# Patient Record
Sex: FEMALE | Race: WHITE | NOT HISPANIC OR LATINO | URBAN - METROPOLITAN AREA
[De-identification: names, ages, dates, MRNs, and addresses within clinical notes are randomized per-mention and may not be internally consistent; named-entity substitution may affect disease eponyms.]

---

## 2018-08-09 ENCOUNTER — EMERGENCY (EMERGENCY)
Facility: HOSPITAL | Age: 48
LOS: 0 days | Discharge: HOME | End: 2018-08-09
Attending: EMERGENCY MEDICINE | Admitting: EMERGENCY MEDICINE

## 2018-08-09 VITALS
OXYGEN SATURATION: 98 % | DIASTOLIC BLOOD PRESSURE: 71 MMHG | RESPIRATION RATE: 18 BRPM | HEART RATE: 86 BPM | TEMPERATURE: 97 F | SYSTOLIC BLOOD PRESSURE: 133 MMHG

## 2018-08-09 VITALS
HEIGHT: 69 IN | OXYGEN SATURATION: 99 % | TEMPERATURE: 98 F | WEIGHT: 250 LBS | DIASTOLIC BLOOD PRESSURE: 81 MMHG | HEART RATE: 69 BPM | SYSTOLIC BLOOD PRESSURE: 165 MMHG | RESPIRATION RATE: 20 BRPM

## 2018-08-09 DIAGNOSIS — Z90.49 ACQUIRED ABSENCE OF OTHER SPECIFIED PARTS OF DIGESTIVE TRACT: ICD-10-CM

## 2018-08-09 DIAGNOSIS — R06.4 HYPERVENTILATION: ICD-10-CM

## 2018-08-09 DIAGNOSIS — T39.1X5A ADVERSE EFFECT OF 4-AMINOPHENOL DERIVATIVES, INITIAL ENCOUNTER: ICD-10-CM

## 2018-08-09 DIAGNOSIS — R10.13 EPIGASTRIC PAIN: ICD-10-CM

## 2018-08-09 DIAGNOSIS — T39.315A ADVERSE EFFECT OF PROPIONIC ACID DERIVATIVES, INITIAL ENCOUNTER: ICD-10-CM

## 2018-08-09 DIAGNOSIS — F41.9 ANXIETY DISORDER, UNSPECIFIED: ICD-10-CM

## 2018-08-09 DIAGNOSIS — Z90.49 ACQUIRED ABSENCE OF OTHER SPECIFIED PARTS OF DIGESTIVE TRACT: Chronic | ICD-10-CM

## 2018-08-09 DIAGNOSIS — R43.9 UNSPECIFIED DISTURBANCES OF SMELL AND TASTE: ICD-10-CM

## 2018-08-09 LAB
ALBUMIN SERPL ELPH-MCNC: 4.1 G/DL — SIGNIFICANT CHANGE UP (ref 3.5–5.2)
ALP SERPL-CCNC: 139 U/L — HIGH (ref 30–115)
ALT FLD-CCNC: 29 U/L — SIGNIFICANT CHANGE UP (ref 0–41)
ANION GAP SERPL CALC-SCNC: 12 MMOL/L — SIGNIFICANT CHANGE UP (ref 7–14)
AST SERPL-CCNC: 52 U/L — HIGH (ref 0–41)
BASE EXCESS BLDV CALC-SCNC: 3.4 MMOL/L — HIGH (ref -2–2)
BASOPHILS # BLD AUTO: 0.14 K/UL — SIGNIFICANT CHANGE UP (ref 0–0.2)
BASOPHILS NFR BLD AUTO: 1.2 % — HIGH (ref 0–1)
BILIRUB DIRECT SERPL-MCNC: <0.2 MG/DL — SIGNIFICANT CHANGE UP (ref 0–0.2)
BILIRUB INDIRECT FLD-MCNC: >0.1 MG/DL — LOW (ref 0.2–1.2)
BILIRUB SERPL-MCNC: 0.3 MG/DL — SIGNIFICANT CHANGE UP (ref 0.2–1.2)
BUN SERPL-MCNC: 10 MG/DL — SIGNIFICANT CHANGE UP (ref 10–20)
CA-I SERPL-SCNC: 1.17 MMOL/L — SIGNIFICANT CHANGE UP (ref 1.12–1.3)
CALCIUM SERPL-MCNC: 9.1 MG/DL — SIGNIFICANT CHANGE UP (ref 8.5–10.1)
CHLORIDE SERPL-SCNC: 101 MMOL/L — SIGNIFICANT CHANGE UP (ref 98–110)
CO2 SERPL-SCNC: 25 MMOL/L — SIGNIFICANT CHANGE UP (ref 17–32)
CREAT SERPL-MCNC: 0.8 MG/DL — SIGNIFICANT CHANGE UP (ref 0.7–1.5)
EOSINOPHIL # BLD AUTO: 0.47 K/UL — SIGNIFICANT CHANGE UP (ref 0–0.7)
EOSINOPHIL NFR BLD AUTO: 4 % — SIGNIFICANT CHANGE UP (ref 0–8)
GAS PNL BLDV: 142 MMOL/L — SIGNIFICANT CHANGE UP (ref 136–145)
GAS PNL BLDV: SIGNIFICANT CHANGE UP
GLUCOSE SERPL-MCNC: 119 MG/DL — HIGH (ref 70–99)
HCO3 BLDV-SCNC: 30 MMOL/L — HIGH (ref 22–29)
HCT VFR BLD CALC: 45.5 % — SIGNIFICANT CHANGE UP (ref 37–47)
HCT VFR BLDA CALC: 43.7 % — SIGNIFICANT CHANGE UP (ref 34–44)
HGB BLD CALC-MCNC: 14.2 G/DL — SIGNIFICANT CHANGE UP (ref 14–18)
HGB BLD-MCNC: 15.5 G/DL — SIGNIFICANT CHANGE UP (ref 12–16)
HOROWITZ INDEX BLDV+IHG-RTO: 21 — SIGNIFICANT CHANGE UP
IMM GRANULOCYTES NFR BLD AUTO: 0.2 % — SIGNIFICANT CHANGE UP (ref 0.1–0.3)
LACTATE BLDV-MCNC: 0.7 MMOL/L — SIGNIFICANT CHANGE UP (ref 0.5–1.6)
LACTATE SERPL-SCNC: 1.3 MMOL/L — SIGNIFICANT CHANGE UP (ref 0.5–2.2)
LIDOCAIN IGE QN: 56 U/L — SIGNIFICANT CHANGE UP (ref 7–60)
LYMPHOCYTES # BLD AUTO: 3.72 K/UL — HIGH (ref 1.2–3.4)
LYMPHOCYTES # BLD AUTO: 31.7 % — SIGNIFICANT CHANGE UP (ref 20.5–51.1)
MAGNESIUM SERPL-MCNC: 2.1 MG/DL — SIGNIFICANT CHANGE UP (ref 1.8–2.4)
MCHC RBC-ENTMCNC: 32.2 PG — HIGH (ref 27–31)
MCHC RBC-ENTMCNC: 34.1 G/DL — SIGNIFICANT CHANGE UP (ref 32–37)
MCV RBC AUTO: 94.4 FL — SIGNIFICANT CHANGE UP (ref 81–99)
MONOCYTES # BLD AUTO: 1.1 K/UL — HIGH (ref 0.1–0.6)
MONOCYTES NFR BLD AUTO: 9.4 % — HIGH (ref 1.7–9.3)
NEUTROPHILS # BLD AUTO: 6.27 K/UL — SIGNIFICANT CHANGE UP (ref 1.4–6.5)
NEUTROPHILS NFR BLD AUTO: 53.5 % — SIGNIFICANT CHANGE UP (ref 42.2–75.2)
NRBC # BLD: 0 /100 WBCS — SIGNIFICANT CHANGE UP (ref 0–0)
PCO2 BLDV: 54 MMHG — HIGH (ref 41–51)
PH BLDV: 7.36 — SIGNIFICANT CHANGE UP (ref 7.26–7.43)
PLATELET # BLD AUTO: 302 K/UL — SIGNIFICANT CHANGE UP (ref 130–400)
PO2 BLDV: 41 MMHG — HIGH (ref 20–40)
POTASSIUM BLDV-SCNC: 3.8 MMOL/L — SIGNIFICANT CHANGE UP (ref 3.3–5.6)
POTASSIUM SERPL-MCNC: 6.7 MMOL/L — CRITICAL HIGH (ref 3.5–5)
POTASSIUM SERPL-SCNC: 6.7 MMOL/L — CRITICAL HIGH (ref 3.5–5)
PROT SERPL-MCNC: 7.4 G/DL — SIGNIFICANT CHANGE UP (ref 6–8)
RBC # BLD: 4.82 M/UL — SIGNIFICANT CHANGE UP (ref 4.2–5.4)
RBC # FLD: 12.1 % — SIGNIFICANT CHANGE UP (ref 11.5–14.5)
SAO2 % BLDV: 76 % — SIGNIFICANT CHANGE UP
SODIUM SERPL-SCNC: 138 MMOL/L — SIGNIFICANT CHANGE UP (ref 135–146)
TROPONIN T SERPL-MCNC: <0.01 NG/ML — SIGNIFICANT CHANGE UP
WBC # BLD: 11.72 K/UL — HIGH (ref 4.8–10.8)
WBC # FLD AUTO: 11.72 K/UL — HIGH (ref 4.8–10.8)

## 2018-08-09 RX ORDER — SODIUM CHLORIDE 9 MG/ML
1000 INJECTION INTRAMUSCULAR; INTRAVENOUS; SUBCUTANEOUS
Qty: 0 | Refills: 0 | Status: DISCONTINUED | OUTPATIENT
Start: 2018-08-09 | End: 2018-08-09

## 2018-08-09 RX ORDER — FAMOTIDINE 10 MG/ML
20 INJECTION INTRAVENOUS ONCE
Qty: 0 | Refills: 0 | Status: COMPLETED | OUTPATIENT
Start: 2018-08-09 | End: 2018-08-09

## 2018-08-09 RX ORDER — ONDANSETRON 8 MG/1
4 TABLET, FILM COATED ORAL ONCE
Qty: 0 | Refills: 0 | Status: COMPLETED | OUTPATIENT
Start: 2018-08-09 | End: 2018-08-09

## 2018-08-09 RX ADMIN — SODIUM CHLORIDE 250 MILLILITER(S): 9 INJECTION INTRAMUSCULAR; INTRAVENOUS; SUBCUTANEOUS at 21:38

## 2018-08-09 RX ADMIN — FAMOTIDINE 20 MILLIGRAM(S): 10 INJECTION INTRAVENOUS at 22:04

## 2018-08-09 RX ADMIN — ONDANSETRON 104 MILLIGRAM(S): 8 TABLET, FILM COATED ORAL at 21:35

## 2018-08-09 RX ADMIN — FAMOTIDINE 100 MILLIGRAM(S): 10 INJECTION INTRAVENOUS at 21:34

## 2018-08-09 NOTE — ED PROVIDER NOTE - PHYSICAL EXAMINATION
Gen: Alert, NAD, +hyperventilating/anxious in appearance  Head: NC, AT, PERRL, EOMI, +bilateral eye lid swelling no erythema   ENT: normal hearing, patent oropharynx mild erythema/no exudate, uvula midline  Neck: +supple, no tenderness  Pulm: Bilateral BS, normal resp effort, no wheeze/stridor/retractions  CV: RRR, no M/R/G, +dist pulses  Abd: soft, +epigastric tenderness  Neuro: AAOx3

## 2018-08-09 NOTE — ED PROVIDER NOTE - MEDICAL DECISION MAKING DETAILS
results reviewed and d/w patient and spouse.  Pt feeling better. wanst to go home.  Will d/c Vicodin.  She will follow up w ith her dentist as well

## 2018-08-09 NOTE — ED ADULT NURSE NOTE - NSIMPLEMENTINTERV_GEN_ALL_ED
Implemented All Universal Safety Interventions:  [place] to call system. Call bell, personal items and telephone within reach. Instruct patient to call for assistance. Room bathroom lighting operational. Non-slip footwear when patient is off stretcher. Physically safe environment: no spills, clutter or unnecessary equipment. Stretcher in lowest position, wheels locked, appropriate side rails in place.

## 2018-08-09 NOTE — ED PROVIDER NOTE - OBJECTIVE STATEMENT
Patient is a 48 years old F pmhx cholecystectomy presents to the ED for evaluation of epigastric abd pain that started today after taking a motrin and a vicodin for toothache. no fever no chills, no drooling, no rash,  sts patient is under great stress at work, no cp, +mild shortness of breath

## 2018-08-09 NOTE — ED PROVIDER NOTE - NS ED ROS FT
Review of Systems  Constitutional: (-) fever  Cardiovascular: (-) chest pain, (-) syncope  Respiratory: (-) cough, (-) shortness of breath  Gastrointestinal: (-) vomiting, (-) diarrhea  Integumentary: (-) rash, (-) edema  Neurological: (-) headache

## 2018-08-09 NOTE — ED PROVIDER NOTE - ATTENDING CONTRIBUTION TO CARE
47 yo F h/o cholecystectomy presents with c/o epigastric pain after taking Vicodin for the first time as well as Motrin for a tooth ache.  Felt SOB as well, since has past, no CP, no vomiting.  ON exam pt in NAD AAO x 3, Lungs CAT B/L no wrr, no rash, + tender epigastric area, ND, no edema,

## 2018-08-09 NOTE — ED ADULT TRIAGE NOTE - CHIEF COMPLAINT QUOTE
As per patient "I had a pain in my tooth and two hours ago I took four of motrin, then my friend gave me Vicodin about an hour and a half ago and it started hurting here (epigastric) and goes up to my chest, I started vomiting too".

## 2020-07-02 ENCOUNTER — INPATIENT (INPATIENT)
Facility: HOSPITAL | Age: 50
LOS: 2 days | Discharge: HOME | End: 2020-07-05
Attending: INTERNAL MEDICINE | Admitting: INTERNAL MEDICINE
Payer: COMMERCIAL

## 2020-07-02 VITALS
WEIGHT: 270.07 LBS | DIASTOLIC BLOOD PRESSURE: 87 MMHG | RESPIRATION RATE: 20 BRPM | HEART RATE: 108 BPM | HEIGHT: 69 IN | OXYGEN SATURATION: 99 % | TEMPERATURE: 98 F | SYSTOLIC BLOOD PRESSURE: 193 MMHG

## 2020-07-02 DIAGNOSIS — Z90.49 ACQUIRED ABSENCE OF OTHER SPECIFIED PARTS OF DIGESTIVE TRACT: Chronic | ICD-10-CM

## 2020-07-02 DIAGNOSIS — D73.5 INFARCTION OF SPLEEN: ICD-10-CM

## 2020-07-02 LAB
ALBUMIN SERPL ELPH-MCNC: 3.8 G/DL — SIGNIFICANT CHANGE UP (ref 3.5–5.2)
ALP SERPL-CCNC: 149 U/L — HIGH (ref 30–115)
ALT FLD-CCNC: 46 U/L — HIGH (ref 0–41)
ANION GAP SERPL CALC-SCNC: 11 MMOL/L — SIGNIFICANT CHANGE UP (ref 7–14)
APTT BLD: 32.8 SEC — SIGNIFICANT CHANGE UP (ref 27–39.2)
AST SERPL-CCNC: 43 U/L — HIGH (ref 0–41)
BASOPHILS # BLD AUTO: 0.1 K/UL — SIGNIFICANT CHANGE UP (ref 0–0.2)
BASOPHILS NFR BLD AUTO: 0.9 % — SIGNIFICANT CHANGE UP (ref 0–1)
BILIRUB SERPL-MCNC: 0.4 MG/DL — SIGNIFICANT CHANGE UP (ref 0.2–1.2)
BLD GP AB SCN SERPL QL: SIGNIFICANT CHANGE UP
BUN SERPL-MCNC: 6 MG/DL — LOW (ref 10–20)
CALCIUM SERPL-MCNC: 9.3 MG/DL — SIGNIFICANT CHANGE UP (ref 8.5–10.1)
CHLORIDE SERPL-SCNC: 104 MMOL/L — SIGNIFICANT CHANGE UP (ref 98–110)
CO2 SERPL-SCNC: 22 MMOL/L — SIGNIFICANT CHANGE UP (ref 17–32)
CREAT SERPL-MCNC: 0.8 MG/DL — SIGNIFICANT CHANGE UP (ref 0.7–1.5)
D DIMER BLD IA.RAPID-MCNC: 484 NG/ML DDU — HIGH (ref 0–230)
EOSINOPHIL # BLD AUTO: 0.27 K/UL — SIGNIFICANT CHANGE UP (ref 0–0.7)
EOSINOPHIL NFR BLD AUTO: 2.3 % — SIGNIFICANT CHANGE UP (ref 0–8)
GLUCOSE SERPL-MCNC: 117 MG/DL — HIGH (ref 70–99)
HCG SERPL QL: NEGATIVE — SIGNIFICANT CHANGE UP
HCT VFR BLD CALC: 41.9 % — SIGNIFICANT CHANGE UP (ref 37–47)
HGB BLD-MCNC: 14.5 G/DL — SIGNIFICANT CHANGE UP (ref 12–16)
IMM GRANULOCYTES NFR BLD AUTO: 0.3 % — SIGNIFICANT CHANGE UP (ref 0.1–0.3)
INR BLD: 1.16 RATIO — SIGNIFICANT CHANGE UP (ref 0.65–1.3)
LACTATE SERPL-SCNC: 1.2 MMOL/L — SIGNIFICANT CHANGE UP (ref 0.7–2)
LIDOCAIN IGE QN: 31 U/L — SIGNIFICANT CHANGE UP (ref 7–60)
LYMPHOCYTES # BLD AUTO: 1.94 K/UL — SIGNIFICANT CHANGE UP (ref 1.2–3.4)
LYMPHOCYTES # BLD AUTO: 16.8 % — LOW (ref 20.5–51.1)
MCHC RBC-ENTMCNC: 32.4 PG — HIGH (ref 27–31)
MCHC RBC-ENTMCNC: 34.6 G/DL — SIGNIFICANT CHANGE UP (ref 32–37)
MCV RBC AUTO: 93.5 FL — SIGNIFICANT CHANGE UP (ref 81–99)
MONOCYTES # BLD AUTO: 0.97 K/UL — HIGH (ref 0.1–0.6)
MONOCYTES NFR BLD AUTO: 8.4 % — SIGNIFICANT CHANGE UP (ref 1.7–9.3)
NEUTROPHILS # BLD AUTO: 8.21 K/UL — HIGH (ref 1.4–6.5)
NEUTROPHILS NFR BLD AUTO: 71.3 % — SIGNIFICANT CHANGE UP (ref 42.2–75.2)
NRBC # BLD: 0 /100 WBCS — SIGNIFICANT CHANGE UP (ref 0–0)
PLATELET # BLD AUTO: 247 K/UL — SIGNIFICANT CHANGE UP (ref 130–400)
POTASSIUM SERPL-MCNC: 5.2 MMOL/L — HIGH (ref 3.5–5)
POTASSIUM SERPL-SCNC: 5.2 MMOL/L — HIGH (ref 3.5–5)
PROT SERPL-MCNC: 6.8 G/DL — SIGNIFICANT CHANGE UP (ref 6–8)
PROTHROM AB SERPL-ACNC: 13.3 SEC — HIGH (ref 9.95–12.87)
RBC # BLD: 4.48 M/UL — SIGNIFICANT CHANGE UP (ref 4.2–5.4)
RBC # FLD: 12.3 % — SIGNIFICANT CHANGE UP (ref 11.5–14.5)
SARS-COV-2 RNA SPEC QL NAA+PROBE: SIGNIFICANT CHANGE UP
SODIUM SERPL-SCNC: 137 MMOL/L — SIGNIFICANT CHANGE UP (ref 135–146)
TROPONIN T SERPL-MCNC: <0.01 NG/ML — SIGNIFICANT CHANGE UP
WBC # BLD: 11.52 K/UL — HIGH (ref 4.8–10.8)
WBC # FLD AUTO: 11.52 K/UL — HIGH (ref 4.8–10.8)

## 2020-07-02 PROCEDURE — 99222 1ST HOSP IP/OBS MODERATE 55: CPT

## 2020-07-02 PROCEDURE — 71046 X-RAY EXAM CHEST 2 VIEWS: CPT | Mod: 26

## 2020-07-02 PROCEDURE — 99285 EMERGENCY DEPT VISIT HI MDM: CPT

## 2020-07-02 PROCEDURE — 71275 CT ANGIOGRAPHY CHEST: CPT | Mod: 26

## 2020-07-02 NOTE — H&P ADULT - NSHPLABSRESULTS_GEN_ALL_CORE
< from: CT Angio Chest w/ IV Cont (07.02.20 @ 16:43) >      EXAM:  CT ANGIO CHEST (W)AW IC            PROCEDURE DATE:  07/02/2020        < from: CT Angio Chest w/ IV Cont (07.02.20 @ 16:43) >      IMPRESSION:     No CT evidence of acute pulmonary embolus.    Findings suspicious for splenic infarct seen medially    Nonspecific bibasilar groundglass opacities.    Nonspecific prominent periportal lymph node measuring 1.6 x 3.3 cm. Outpatient abdominal pelvic CT is recommended for further evaluation    Right upper lobe 2 mm pulmonary nodule. Follow-up noncontrast chest CT is recommended in 12 months.    Spoke with MAURO MULLINS on 7/2/2020 5:50 PM with readback.      KEEGAN ANGULO M.D., RESIDENT RADIOLOGIST  This document has been electronically signed.  ARISTIDES KOHLER M.D., ATTENDING RADIOLOGIST  This document has been electronically signed. Jul 2 2020  5:57PM      < end of copied text >                          14.5   11.52 )-----------( 247      ( 02 Jul 2020 15:30 )             41.9     07-02    137  |  104  |  6<L>  ----------------------------<  117<H>  5.2<H>   |  22  |  0.8    Ca    9.3      02 Jul 2020 15:30    TPro  6.8  /  Alb  3.8  /  TBili  0.4  /  DBili  x   /  AST  43<H>  /  ALT  46<H>  /  AlkPhos  149<H>  07-02            PT/INR - ( 02 Jul 2020 18:07 )   PT: 13.30 sec;   INR: 1.16 ratio         PTT - ( 02 Jul 2020 18:07 )  PTT:32.8 sec  Lactate Trend  07-02 @ 15:30 Lactate:1.2     CARDIAC MARKERS ( 02 Jul 2020 15:30 )  x     / <0.01 ng/mL / x     / x     / x          CAPILLARY BLOOD GLUCOSE

## 2020-07-02 NOTE — ED PROVIDER NOTE - NS ED ROS FT
Review of Systems    Constitutional: (-) fever/ chills (-) weight loss  Eyes/ENT: (-) blurry vision, (-) epistaxis (-) sore throat (-) ear pain  Cardiovascular: (+)left chest pain, (-) syncope (-) palpitations  Respiratory: (-) cough, (-) shortness of breath  Gastrointestinal: (-) vomiting, (-) diarrhea (-) abdominal pain  Musculoskeletal: (-) neck pain, (-) back pain, (-) joint pain   Integumentary: (-) rash, (-) swelling  Neurological: (-) headache, (-) altered mental status  Psychiatric: (-) hallucinations or depression

## 2020-07-02 NOTE — H&P ADULT - HISTORY OF PRESENT ILLNESS
[age]yo female presents to the ER with 49yo female presents to the ER with left sided pleuritic chest pain since yesterday. Actually had abdominal pains over course of 2 days relieved with GAS-X. ER work-up revealed a splenic infarct

## 2020-07-02 NOTE — CONSULT NOTE ADULT - PROBLEM SELECTOR RECOMMENDATION 9
No acute surgical intervention  Presently no need for anticoagulation  D/w vascular fellow Dr Brown No acute surgical intervention  Presently no need for anticoagulation  Further workup by medical team  D/w vascular fellow Dr Brown whom will review ct scan  vascular team to follow up

## 2020-07-02 NOTE — ED PROVIDER NOTE - PROGRESS NOTE DETAILS
d/w surgery (covering vascular) PA who is at bedside vascular and gen surgery recommends no anticoagulation at this time. will admit to medical service dr. arana aware of admission E. Klerman: Pt signed out to me by Dr. Davey while awaiting covid result for admission.  Pt presented for flank pain, found to have splenic infarct.  Vasc and gen surg consulted, will adm for further care.

## 2020-07-02 NOTE — ED PROVIDER NOTE - MUSCULOSKELETAL, MLM
Spine appears normal, no CVA tenderness, range of motion is not limited, no muscle or joint tenderness

## 2020-07-02 NOTE — H&P ADULT - PROBLEM SELECTOR PLAN 2
Actually also showed lung nodule, ground glass opacities and enlarged lymph node  Will need follow-up

## 2020-07-02 NOTE — ED PROVIDER NOTE - OBJECTIVE STATEMENT
49 y/o female presents to the ED with pleuritic left sided chest pain since last night. patient denies any sob, palpitations, left arm pain, back pain, or syncope. patient states symptoms started two days ago with abdominal pain which was relieved with gas-x. patient states she drinks a lot of coffee and smokes cigarettes. patient denies any pain radiating to back . patient denies any family hx of clotting disorders , hx DVT or recent travel. patient c/o sharp pain.

## 2020-07-02 NOTE — ED PROVIDER NOTE - CLINICAL SUMMARY MEDICAL DECISION MAKING FREE TEXT BOX
50yF p/w L sided chest pain.  CT w/o PE but w/ splenic infarct.  No sig clinical suspicion for ACS.  Vasc and gen surg consulted, pain treated, will adm for further care.  Pt hemodynamically stable at this time.

## 2020-07-03 DIAGNOSIS — R93.89 ABNORMAL FINDINGS ON DIAGNOSTIC IMAGING OF OTHER SPECIFIED BODY STRUCTURES: ICD-10-CM

## 2020-07-03 LAB
ALBUMIN SERPL ELPH-MCNC: 4.1 G/DL — SIGNIFICANT CHANGE UP (ref 3.5–5.2)
ALP SERPL-CCNC: 149 U/L — HIGH (ref 30–115)
ALT FLD-CCNC: 44 U/L — HIGH (ref 0–41)
ANION GAP SERPL CALC-SCNC: 12 MMOL/L — SIGNIFICANT CHANGE UP (ref 7–14)
AST SERPL-CCNC: 28 U/L — SIGNIFICANT CHANGE UP (ref 0–41)
BILIRUB SERPL-MCNC: 0.3 MG/DL — SIGNIFICANT CHANGE UP (ref 0.2–1.2)
BUN SERPL-MCNC: 6 MG/DL — LOW (ref 10–20)
CALCIUM SERPL-MCNC: 9.3 MG/DL — SIGNIFICANT CHANGE UP (ref 8.5–10.1)
CHLORIDE SERPL-SCNC: 100 MMOL/L — SIGNIFICANT CHANGE UP (ref 98–110)
CO2 SERPL-SCNC: 23 MMOL/L — SIGNIFICANT CHANGE UP (ref 17–32)
CREAT SERPL-MCNC: 0.6 MG/DL — LOW (ref 0.7–1.5)
GLUCOSE SERPL-MCNC: 114 MG/DL — HIGH (ref 70–99)
HCT VFR BLD CALC: 41.4 % — SIGNIFICANT CHANGE UP (ref 37–47)
HGB BLD-MCNC: 14.1 G/DL — SIGNIFICANT CHANGE UP (ref 12–16)
MCHC RBC-ENTMCNC: 32.1 PG — HIGH (ref 27–31)
MCHC RBC-ENTMCNC: 34.1 G/DL — SIGNIFICANT CHANGE UP (ref 32–37)
MCV RBC AUTO: 94.3 FL — SIGNIFICANT CHANGE UP (ref 81–99)
NRBC # BLD: 0 /100 WBCS — SIGNIFICANT CHANGE UP (ref 0–0)
PLATELET # BLD AUTO: 254 K/UL — SIGNIFICANT CHANGE UP (ref 130–400)
POTASSIUM SERPL-MCNC: 4.2 MMOL/L — SIGNIFICANT CHANGE UP (ref 3.5–5)
POTASSIUM SERPL-SCNC: 4.2 MMOL/L — SIGNIFICANT CHANGE UP (ref 3.5–5)
PROT SERPL-MCNC: 6.5 G/DL — SIGNIFICANT CHANGE UP (ref 6–8)
RBC # BLD: 4.39 M/UL — SIGNIFICANT CHANGE UP (ref 4.2–5.4)
RBC # FLD: 12.3 % — SIGNIFICANT CHANGE UP (ref 11.5–14.5)
SARS-COV-2 IGG SERPL QL IA: NEGATIVE — SIGNIFICANT CHANGE UP
SARS-COV-2 IGM SERPL IA-ACNC: 0.07 INDEX — SIGNIFICANT CHANGE UP
SODIUM SERPL-SCNC: 135 MMOL/L — SIGNIFICANT CHANGE UP (ref 135–146)
WBC # BLD: 11.9 K/UL — HIGH (ref 4.8–10.8)
WBC # FLD AUTO: 11.9 K/UL — HIGH (ref 4.8–10.8)

## 2020-07-03 PROCEDURE — 99254 IP/OBS CNSLTJ NEW/EST MOD 60: CPT

## 2020-07-03 PROCEDURE — 74177 CT ABD & PELVIS W/CONTRAST: CPT | Mod: 26

## 2020-07-03 PROCEDURE — 99233 SBSQ HOSP IP/OBS HIGH 50: CPT

## 2020-07-03 RX ORDER — PANTOPRAZOLE SODIUM 20 MG/1
40 TABLET, DELAYED RELEASE ORAL
Refills: 0 | Status: DISCONTINUED | OUTPATIENT
Start: 2020-07-03 | End: 2020-07-05

## 2020-07-03 RX ORDER — ENOXAPARIN SODIUM 100 MG/ML
120 INJECTION SUBCUTANEOUS
Refills: 0 | Status: DISCONTINUED | OUTPATIENT
Start: 2020-07-03 | End: 2020-07-05

## 2020-07-03 RX ADMIN — Medication 30 MILLILITER(S): at 06:31

## 2020-07-03 RX ADMIN — PANTOPRAZOLE SODIUM 40 MILLIGRAM(S): 20 TABLET, DELAYED RELEASE ORAL at 06:30

## 2020-07-03 RX ADMIN — ENOXAPARIN SODIUM 120 MILLIGRAM(S): 100 INJECTION SUBCUTANEOUS at 21:37

## 2020-07-03 NOTE — CONSULT NOTE ADULT - SUBJECTIVE AND OBJECTIVE BOX
HPI:  Patient is an 49yo F smoker, obese, presenting to Ed with c/o acute onset of left side chest/abd pain for one day, described as intermittent, moderate to severe, aggravated by deep breathing and physical activity, and relieved with rest. Patient reporst nausea and vomiting 2 days ago. Pt denies denies associated fever, sob, cough, dysuria, hematochezia.       PAST MEDICAL & SURGICAL HISTORY:  No pertinent past medical history  History of cholecystectomy      Allergies    No Known Allergies      ROS:  General:	no fever, weight loss,  chills  Skin: no rash, ulcers  Ophthalmologic: no visual changes  ENMT:	no sore throat  Respiratory and Thorax: no cough, wheeze,  sob  Cardiovascular:	no chest pain, palpitations, dizziness  Gastrointestinal:	+ nausea, vomiting,  abd pain  Genitourinary:	no dysuria, hematuria  Musculoskeletal:	no joint pains  Neurological:	 no speech disturbance, focal weakness, numbness  Psychiatric:	no depression, anxiety, psychosis  Hematology/Lymphatics:	no anemia  Endocrine:	no polyuria, polydipsia        Vital Signs Last 24 Hrs  T(C): 36.9 (02 Jul 2020 14:59), Max: 36.9 (02 Jul 2020 14:59)  T(F): 98.4 (02 Jul 2020 14:59), Max: 98.4 (02 Jul 2020 14:59)  HR: 82 (02 Jul 2020 16:25) (82 - 108)  BP: 166/84 (02 Jul 2020 16:25) (123/59 - 193/87)  BP(mean): --  RR: 20 (02 Jul 2020 16:25) (20 - 20)  SpO2: 99% (02 Jul 2020 16:25) (96% - 99%)    PHYSICAL EXAM:      Constitutional: A&Ox4  Respiratory: cta b/l  Cardiovascular: s1 s2 rrr  Gastrointestinal: soft nt  nd + bs no rebound or guarding  Genitourinary: no cva tenderness  Extremities: normal rom, no edema, calf tenderness  Neurological:no focal deficits  Skin: no rash                            14.5   11.52 )-----------( 247      ( 02 Jul 2020 15:30 )             41.9       07-02    137  |  104  |  6<L>  ----------------------------<  117<H>  5.2<H>   |  22  |  0.8    Ca    9.3      02 Jul 2020 15:30    TPro  6.8  /  Alb  3.8  /  TBili  0.4  /  DBili  x   /  AST  43<H>  /  ALT  46<H>  /  AlkPhos  149<H>  07-02          < from: CT Angio Chest w/ IV Cont (07.02.20 @ 16:43) >  BONES/SOFT TISSUES: Multilevel degenerative changes of the spine..      IMPRESSION:     No CT evidence of acute pulmonary embolus.    Findings suspicious for splenic infarct seen medially    Nonspecific bibasilar groundglass opacities.    Nonspecific prominent periportal lymph node measuring 1.6 x 3.3 cm. Outpatient abdominal pelvic CT is recommended for further evaluation    Right upper lobe 2 mm pulmonary nodule. Follow-up noncontrast chest CT is recommended in 12 months.
Patient is a 50y old  Female who presents with a chief complaint of Splenic infarct (03 Jul 2020 09:58)      HPI:  51yo female presents to the ER with left sided pleuritic chest pain since yesterday. Actually had abdominal pains over course of 2 days relieved with GAS-X. ER work-up revealed a splenic infarct (02 Jul 2020 19:58).  No prior H/o dvt OR PE. No H/o miscarriages. No FH of DVT/PE,  No H/O Covid infection recenly       ROS:  Negative     PAST MEDICAL & SURGICAL HISTORY:  No pertinent past medical history  History of cholecystectomy      SOCIAL HISTORY: Smoker 1ppd    FAMILY HISTORY: No H/O DVT or cancer      MEDICATIONS  (STANDING):  enoxaparin Injectable 120 milliGRAM(s) SubCutaneous two times a day  pantoprazole    Tablet 40 milliGRAM(s) Oral before breakfast    MEDICATIONS  (PRN):  aluminum hydroxide/magnesium hydroxide/simethicone Suspension 30 milliLiter(s) Oral every 6 hours PRN Dyspepsia      Allergies    No Known Allergies    Intolerances        Vital Signs Last 24 Hrs  T(C): 36.4 (03 Jul 2020 13:18), Max: 37.1 (03 Jul 2020 06:22)  T(F): 97.6 (03 Jul 2020 13:18), Max: 98.7 (03 Jul 2020 06:22)  HR: 72 (03 Jul 2020 13:18) (72 - 108)  BP: 148/65 (03 Jul 2020 13:18) (123/59 - 193/87)  BP(mean): --  RR: 18 (03 Jul 2020 13:18) (18 - 20)  SpO2: 98% (02 Jul 2020 21:43) (96% - 99%)    PHYSICAL EXAM  General: adult in NAD  HEENT: clear oropharynx, anicteric sclera, pink conjunctiva  Neck: supple  CV: normal S1/S2 with no murmur rubs or gallops  Lungs: positive air movement b/l ant lungs,clear to auscultation, no wheezes, no rales  Abdomen: soft, mild LUQ tenderness,  non-distended, no hepatosplenomegaly  Ext: no clubbing cyanosis or edema  Skin: no rashes and no petechiae  Neuro: alert and oriented X 4, no focal deficits  Breasts No masses palpable    LABS:                          14.1   11.90 )-----------( 254      ( 03 Jul 2020 07:18 )             41.4         Mean Cell Volume : 94.3 fL  Mean Cell Hemoglobin : 32.1 pg  Mean Cell Hemoglobin Concentration : 34.1 g/dL  Auto Neutrophil # : x  Auto Lymphocyte # : x  Auto Monocyte # : x  Auto Eosinophil # : x  Auto Basophil # : x  Auto Neutrophil % : x  Auto Lymphocyte % : x  Auto Monocyte % : x  Auto Eosinophil % : x  Auto Basophil % : x      Serial CBC's  07-03 @ 07:18  Hct-41.4 / Hgb-14.1 / Plat-254 / RBC-4.39 / WBC-11.90  Serial CBC's  07-02 @ 15:30  Hct-41.9 / Hgb-14.5 / Plat-247 / RBC-4.48 / WBC-11.52      07-03    135  |  100  |  6<L>  ----------------------------<  114<H>  4.2   |  23  |  0.6<L>    Ca    9.3      03 Jul 2020 07:18    TPro  6.5  /  Alb  4.1  /  TBili  0.3  /  DBili  x   /  AST  28  /  ALT  44<H>  /  AlkPhos  149<H>  07-03      PT/INR - ( 02 Jul 2020 18:07 )   PT: 13.30 sec;   INR: 1.16 ratio         PTT - ( 02 Jul 2020 18:07 )  PTT:32.8 sec                BLOOD SMEAR INTERPRETATION:       RADIOLOGY & ADDITIONAL STUDIES:    < from: CT Abdomen and Pelvis w/ IV Cont (07.03.20 @ 13:03) >  CT ABDOMEN AND PELVIS IC            PROCEDURE DATE:  07/03/2020            INTERPRETATION:  CLINICAL STATEMENT: Splenic infarct      TECHNIQUE: Contiguous axial CT images were obtained from the lower chest to the pubic symphysis following administration of 100cc Optiray 320 intravenous contrast.  Oral contrast was not administered.  Reformatted images in the coronal and sagittal planes were acquired.    Correlation is made with CT angiography of the chest July 2, 2020      FINDINGS:    LOWER CHEST: Left basilar consolidation/atelectasis and trace effusion.    HEPATOBILIARY: Post cholecystectomy. No suspicious parenchymal lesion or biliary ductal dilatation.    SPLEEN: Wedge-shaped hypodensity posterior spleen consistent with infarction. Main splenic artery is patent.    PANCREAS: Unremarkable.    ADRENAL GLANDS: Unremarkable.    KIDNEYS: Symmetric renal enhancement without hydronephrosis.    ABDOMINOPELVIC NODES: Nonspecific periportal lymphadenopathy..    PELVIC ORGANS: Left adnexal 1.9 cm cyst (series 4 image 253).    PERITONEUM/MESENTERY/BOWEL: No bowel obstruction, pneumatosis, pneumoperitoneum or ascites. Normal caliber appendix. Scattered colonic diverticuli..    BONES/SOFT TISSUES: No acute osseous abnormality..    IMPRESSION:     Left basilar consolidation/atelectasis and trace effusion. Findings may reflect pneumonia superimposed on atelectasis.    Splenic infarction, as above. Main splenic artery is patent.    Nonspecific periportal lymphadenopathy.      < from: CT Angio Chest w/ IV Cont (07.02.20 @ 16:43) >   CT ANGIO CHEST (W)AW IC            PROCEDURE DATE:  07/02/2020            INTERPRETATION:  CLINICAL STATEMENT: Left-sided pleuritic chest pain.    TECHNIQUE: Multislice helical sections were obtained from the thoracic inlet to the lung basesduring rapid administration of 85 mL Optiray 320 intravenous contrast using a CTA protocol. Thin sections were reconstructed through the pulmonary vasculature. Coronal, sagittal and MIP reformatted images are also submitted.    COMPARISON CT: None.      FINDINGS:    PULMONARY EMBOLUS: No central or segmental pulmonary embolus..    LUNGS, PLEURA, AIRWAYS: Patent proximal tracheobronchial tree. No pleural effusion, pneumothorax. Bilateral subsegmental atelectasis. No honeycombing or bronchiectasis.There are bibasilar nonspecific groundglass opacities.    There is a 2 mm right upper lobe pulmonary nodule (3/150).    THORACIC NODES: Thyroid gland is present. No mediastinal, hilar or axillary lymphadenopathy. There are nonspecific subcentimeter mediastinal lymph nodes..    MEDIASTINUM/GREAT VESSELS: Normal heart size. No pericardial effusion. Main pulmonary artery and thoracic aorta are of normal caliber..    VISUALIZED UPPER ABDOMEN: Partially visualized upper abdomen demonstrates a medial splenic wedge shaped hypodensity suspicious for splenic infarct. Mildly prominent nonspecific periportal lymph node and outpatient abdominal pelvic CT is recommended. This measures 1.6 x 3.3 cm..    BONES/SOFT TISSUES: Multilevel degenerative changes of the spine..      IMPRESSION:     No CT evidence of acute pulmonary embolus.    Findings suspicious for splenic infarct seen medially    Nonspecific bibasilar groundglass opacities.    Nonspecific prominent periportal lymph node measuring 1.6 x 3.3 cm. Outpatient abdominal pelvic CT is recommended for further evaluation    Right upper lobe 2 mm pulmonary nodule. Follow-up noncontrast chest CT is recommended in 12 months.      < end of copied text >    < end of copied text >

## 2020-07-03 NOTE — CHART NOTE - NSCHARTNOTEFT_GEN_A_CORE
Patient seen and examined throughout the course of the day.    Results of Labs/Imaging discussed as well as patient's plan.        -pt with splenic infract , placed on full dose anticoagulation as per hematology   -molecular genetic prothrombin and factor V ordered, consent obtained from the patient explained in detail to the pt a positive result may not result in disease , might require further genetic consultation. A detailed description of the risks associated with lab tests given,  pt read and verbalized understood the risks. Pt signed the consent   All patient's/family questions answered.  Patient and family encouraged to contact PA with any further issues.

## 2020-07-03 NOTE — CONSULT NOTE ADULT - ASSESSMENT
Pt is a 51yo F with splenic infarct.
In summary this is a 51yo female with findings of splenic infarct ; etiology unclear.    RECS  R/O thrombophilia: Check Antiphospholipid Ab, Cardiolipin Ab, Protein C, S, AT III, Marquis -2 mutation, Factor V leiden, PT gene mutation.  ECHO to R/O cardiac source for embolus.    Pain control with analgesics.  May consider AC especially if a cause is confirmed.  Follow up in  hematology Clinic.    Plan d/w hospitalist

## 2020-07-03 NOTE — PROGRESS NOTE ADULT - ASSESSMENT
51yo female presents to the ER with left sided pleuritic chest pain since yesterday. Actually had abdominal pains over course of 2 days relieved with GAS-X. ER work-up revealed a splenic infarct     LUQ pain likely due splenic infarct  - discussed with hematology-will keep in hospital until prelim work up returns as pt may need to go home with anticoagulation  - hematology recommends;  protein c&s, antithrombin 3, prothrombin, factor v leiden, harjinder-2, antipholoiphid, anticardiolipin, lupus antibodies, COVID-19 antibodie testing, lipid profile  agree with starting full dose lovenox as pt still actively having pain  ct abdomen  2d echo r/o valve disease (has history of arrhytmias)  breast exam    Groundglass opacities and lung nodule  - pt is active 1.5 pack day smoker   - smoking cessation counseling done  - outpt follow up for nodules  - opacites could be from smoking    DVT px (on therapeutic dose)  Full Code  Independent, from home  Works at [employer], has daily temperature screening, denied having COVID 19 symptoms    #Progress Note Handoff:  Pending (specify):  Extensive lab testint  Family discussion: discussed pending heme work up  Disposition: Home_x__/SNF___/Other________/Unknown at this time________

## 2020-07-04 LAB
CHOLEST SERPL-MCNC: 172 MG/DL — SIGNIFICANT CHANGE UP (ref 100–200)
HDLC SERPL-MCNC: 47 MG/DL — LOW
LIPID PNL WITH DIRECT LDL SERPL: 114 MG/DL — SIGNIFICANT CHANGE UP (ref 4–129)
TOTAL CHOLESTEROL/HDL RATIO MEASUREMENT: 3.7 RATIO — LOW (ref 4–5.5)
TRIGL SERPL-MCNC: 85 MG/DL — SIGNIFICANT CHANGE UP (ref 10–149)

## 2020-07-04 PROCEDURE — 99232 SBSQ HOSP IP/OBS MODERATE 35: CPT

## 2020-07-04 RX ADMIN — PANTOPRAZOLE SODIUM 40 MILLIGRAM(S): 20 TABLET, DELAYED RELEASE ORAL at 05:42

## 2020-07-04 RX ADMIN — ENOXAPARIN SODIUM 120 MILLIGRAM(S): 100 INJECTION SUBCUTANEOUS at 05:42

## 2020-07-04 RX ADMIN — ENOXAPARIN SODIUM 120 MILLIGRAM(S): 100 INJECTION SUBCUTANEOUS at 18:02

## 2020-07-04 NOTE — PROGRESS NOTE ADULT - ASSESSMENT
49yo female presents to the ER with left sided pleuritic chest pain since yesterday. Actually had abdominal pains over course of 2 days relieved with GAS-X. ER work-up revealed a splenic infarct     LUQ pain likely due splenic infarct  - discussed with hematology-will keep in hospital until prelim work up returns as pt may need to go home with anticoagulation  - discussed with hematology yesterday  protein c&s, antithrombin 3, prothrombin, factor v leiden, harjinder-2, antipholoiphid, anticardiolipin, lupus antibodies, COVID-19 antibodie testing, lipid profile  agree with starting full dose lovenox as pt still actively having pain  COVID-19 AB negative  ct abdomen  confirms splenic infarct, underwise unremarkable  2d echo r/o valve disease (has history of arrhytmias)  unremarkable      Groundglass opacities and lung nodule  - ct abdomen showed more atelectasis than groundglass  - pt is active 1.5 pack day smoker   - smoking cessation counseling done  - outpt follow up for nodules  - opacites could be from smoking    DVT px (on therapeutic dose)  Full Code  Independent, from home  Works at [employer], has daily temperature screening, denied having COVID 19 symptoms    #Progress Note Handoff:  Pending (specify):  Extensive lab testing  Family discussion: discussed pending heme work up - pt to stay in house until some labs return so decision can be made about a/c  Disposition: Home_x__/SNF___/Other________/Unknown at this time________

## 2020-07-04 NOTE — PROGRESS NOTE ADULT - SUBJECTIVE AND OBJECTIVE BOX
CHIEF COMPLAINT:    Patient is a 50y old  Female who presents with a chief complaint of Splenic infarct    INTERVAL HPI/OVERNIGHT EVENTS:    Patient seen and examined at bedside. No acute overnight events occurred.    ROS: Reports LUQ. All other systems are negative.    Vital Signs:    T(F): 98.7 (07-03-20 @ 06:22), Max: 98.7 (07-03-20 @ 06:22)  HR: 87 (07-03-20 @ 06:22) (79 - 108)  BP: 135/62 (07-03-20 @ 06:22) (123/59 - 193/87)  RR: 18 (07-03-20 @ 06:22) (18 - 20)  SpO2: 98% (07-02-20 @ 21:43) (96% - 99%)  I&O's Summary    Daily Height in cm: 175.26 (02 Jul 2020 22:34)    Daily   CAPILLARY BLOOD GLUCOSE    PHYSICAL EXAM:  GENERAL:  NAD, obese  SKIN: No rashes or lesions  HEENT: Atraumatic. Normocephalic. Anicteric  NECK:  No JVD.   PULMONARY: Clear to ausculation bilaterally. No wheezing. No rales  CVS: Normal S1, S2. Regular rate and rhythm. No murmurs.  ABDOMEN/GI: Soft, tenderness to LUQ, no splenomegaly, Nondistended; Bowel sounds are present  EXTREMITIES:  No edema B/L LE.  NEUROLOGIC:  No motor deficit.  PSYCH: Alert & oriented x 3, normal affect    LABS:                        14.1   11.90 )-----------( 254      ( 03 Jul 2020 07:18 )             41.4     07-03    135  |  100  |  6<L>  ----------------------------<  114<H>  4.2   |  23  |  0.6<L>    Ca    9.3      03 Jul 2020 07:18    TPro  6.5  /  Alb  4.1  /  TBili  0.3  /  DBili  x   /  AST  28  /  ALT  44<H>  /  AlkPhos  149<H>  07-03    PT/INR - ( 02 Jul 2020 18:07 )   PT: 13.30 sec;   INR: 1.16 ratio         PTT - ( 02 Jul 2020 18:07 )  PTT:32.8 sec    Trop <0.01, CKMB --, CK --, 07-02-20 @ 15:30        RADIOLOGY & ADDITIONAL TESTS:  echo and CT abdomen pending    Medications:  Standing  enoxaparin Injectable 120 milliGRAM(s) SubCutaneous two times a day  pantoprazole    Tablet 40 milliGRAM(s) Oral before breakfast    PRN Meds  aluminum hydroxide/magnesium hydroxide/simethicone Suspension 30 milliLiter(s) Oral every 6 hours PRN
CHIEF COMPLAINT:    Patient is a 50y old  Female who presents with a chief complaint of Splenic infarct     INTERVAL HPI/OVERNIGHT EVENTS:    Patient seen and examined at bedside. No acute overnight events occurred.    ROS: Reports  LUQ pain with deep breaths. All other systems are negative.    Vital Signs:    T(F): 97.6 (07-04-20 @ 05:32), Max: 98.4 (07-03-20 @ 21:00)  HR: 78 (07-04-20 @ 05:32) (72 - 78)  BP: 120/58 (07-04-20 @ 05:32) (118/64 - 148/65)  RR: 16 (07-04-20 @ 05:32) (16 - 18)    PHYSICAL EXAM:  GENERAL:  NAD  SKIN: No rashes or lesions  HEENT: Atraumatic. Normocephalic. Anicteric  NECK:  No JVD.   PULMONARY: Clear to ausculation bilaterally. No wheezing. No rales  CVS: Normal S1, S2. Regular rate and rhythm. No murmurs.  ABDOMEN/GI: Soft, Nontender, Nondistended; Bowel sounds are present  EXTREMITIES:  No edema B/L LE.  NEUROLOGIC:  No motor deficit.  PSYCH: Alert & oriented x 3, normal affect    Consultant(s) Notes Reviewed:  [x ] YES  [ ] NO    LABS:                        14.1   11.90 )-----------( 254      ( 03 Jul 2020 07:18 )             41.4     07-03    135  |  100  |  6<L>  ----------------------------<  114<H>  4.2   |  23  |  0.6<L>    Ca    9.3      03 Jul 2020 07:18    TPro  6.5  /  Alb  4.1  /  TBili  0.3  /  DBili  x   /  AST  28  /  ALT  44<H>  /  AlkPhos  149<H>  07-03    PT/INR - ( 02 Jul 2020 18:07 )   PT: 13.30 sec;   INR: 1.16 ratio         PTT - ( 02 Jul 2020 18:07 )  PTT:32.8 sec    Trop <0.01, CKMB --, CK --, 07-02-20 @ 15:30        RADIOLOGY & ADDITIONAL TESTS:  2d echo unremarkable  < from: CT Abdomen and Pelvis w/ IV Cont (07.03.20 @ 13:03) >  Left basilar consolidation/atelectasis and trace effusion. Findings may reflect pneumonia superimposed on atelectasis.    Splenic infarction, as above. Main splenic artery is patent.    Nonspecific periportal lymphadenopathy.    < end of copied text >      Medications:  Standing  enoxaparin Injectable 120 milliGRAM(s) SubCutaneous two times a day  pantoprazole    Tablet 40 milliGRAM(s) Oral before breakfast    PRN Meds  aluminum hydroxide/magnesium hydroxide/simethicone Suspension 30 milliLiter(s) Oral every 6 hours PRN

## 2020-07-05 ENCOUNTER — TRANSCRIPTION ENCOUNTER (OUTPATIENT)
Age: 50
End: 2020-07-05

## 2020-07-05 VITALS
SYSTOLIC BLOOD PRESSURE: 97 MMHG | HEART RATE: 91 BPM | RESPIRATION RATE: 18 BRPM | DIASTOLIC BLOOD PRESSURE: 54 MMHG | TEMPERATURE: 98 F

## 2020-07-05 PROCEDURE — 99238 HOSP IP/OBS DSCHRG MGMT 30/<: CPT

## 2020-07-05 RX ORDER — APIXABAN 2.5 MG/1
1 TABLET, FILM COATED ORAL
Qty: 28 | Refills: 0
Start: 2020-07-05 | End: 2020-07-18

## 2020-07-05 RX ADMIN — PANTOPRAZOLE SODIUM 40 MILLIGRAM(S): 20 TABLET, DELAYED RELEASE ORAL at 06:11

## 2020-07-05 RX ADMIN — ENOXAPARIN SODIUM 120 MILLIGRAM(S): 100 INJECTION SUBCUTANEOUS at 06:11

## 2020-07-05 NOTE — DISCHARGE NOTE NURSING/CASE MANAGEMENT/SOCIAL WORK - PATIENT PORTAL LINK FT
You can access the FollowMyHealth Patient Portal offered by [facility] by registering at the following website: [email or web address]. By joining [facility]’s FollowMyHealth portal, you will also be able to view your health information using other applications (apps) compatible with our system.

## 2020-07-05 NOTE — DISCHARGE NOTE PROVIDER - CARE PROVIDER_API CALL
RAKESH HEIN  Hematology/Oncology  [address] [place] [address]  Phone: [phone]  Fax: [phone]  Follow Up Time:

## 2020-07-05 NOTE — DISCHARGE NOTE PROVIDER - NSDCCPCAREPLAN_GEN_ALL_CORE_FT
PRINCIPAL DISCHARGE DIAGNOSIS  Diagnosis: Splenic infarct  Assessment and Plan of Treatment: Please follow up with Dr. Saha. Her office should call you with follow up time. If you do not hear from them in 1-2 days, please call the office. Please take eliquis. Avoid taking NSAIDS (advil, motrin, naproxan) with Eliquis

## 2020-07-05 NOTE — DISCHARGE NOTE PROVIDER - HOSPITAL COURSE
49yo female presents to the ER with left sided pleuritic chest pain since yesterday. Actually had abdominal pains over course of 2 days relieved with GAS-X. ER work-up revealed a splenic infarct . Extensive work up sent for hypercoaguable cause of infarct. Spoke Hematology Dr. Saha who is agreeable to follow up results as outpt and agrees with Eliquis for now.         PHYSICAL EXAM:    GENERAL: NAD, speaks in full sentences, no signs of respiratory distress    HEAD:  Atraumatic, Normocephalic    EYES: EOMI, PERRLA, conjunctiva and sclera clear    NECK: Supple, No JVD    CHEST/LUNG: Clear to auscultation bilaterally; No wheeze; No crackles; No accessory muscles used    HEART: Regular rate and rhythm; No murmurs;     ABDOMEN: Soft, Nontender, Nondistended; Bowel sounds present; No guarding    EXTREMITIES:  2+ Peripheral Pulses, No cyanosis or edema    PSYCH: AAOx3    NEUROLOGY: non-focal    SKIN: No rashes or lesions

## 2020-07-06 LAB
AT III ACT/NOR PPP CHRO: 88 % — SIGNIFICANT CHANGE UP (ref 85–135)
CARDIOLIPIN AB SER-ACNC: POSITIVE

## 2020-07-07 DIAGNOSIS — D73.5 INFARCTION OF SPLEEN: ICD-10-CM

## 2020-07-07 DIAGNOSIS — R91.1 SOLITARY PULMONARY NODULE: ICD-10-CM

## 2020-07-07 DIAGNOSIS — F17.210 NICOTINE DEPENDENCE, CIGARETTES, UNCOMPLICATED: ICD-10-CM

## 2020-07-07 DIAGNOSIS — R59.9 ENLARGED LYMPH NODES, UNSPECIFIED: ICD-10-CM

## 2020-07-07 DIAGNOSIS — J98.11 ATELECTASIS: ICD-10-CM

## 2020-07-07 LAB
B2 GLYCOPROT1 AB SER QL: NEGATIVE — SIGNIFICANT CHANGE UP
JAK2 P.V617F BLD/T QL: SIGNIFICANT CHANGE UP

## 2020-07-08 LAB
CARDIOLIPIN IGM SER-MCNC: 22.1 MPL — HIGH (ref 0–12.5)
CARDIOLIPIN IGM SER-MCNC: <5 GPL — SIGNIFICANT CHANGE UP (ref 0–12.5)
DEPRECATED CARDIOLIPIN IGA SER: <5 APL — SIGNIFICANT CHANGE UP (ref 0–12.5)
PHOSPHOLIPID SERPL-MCNC: 201 MG/DL — SIGNIFICANT CHANGE UP (ref 151–264)
PTR INTERPRETATION: SIGNIFICANT CHANGE UP

## 2020-07-10 LAB
DRVVT SCREEN TO CONFIRM RATIO: SIGNIFICANT CHANGE UP
LA NT DPL PPP QL: SIGNIFICANT CHANGE UP
NORMALIZED SCT PPP-RTO: 0.84 RATIO — SIGNIFICANT CHANGE UP (ref 0–1.16)
NORMALIZED SCT PPP-RTO: SIGNIFICANT CHANGE UP
PROT C ACT/NOR PPP: 87 % — SIGNIFICANT CHANGE UP (ref 65–129)

## 2020-07-16 LAB — PROT S FREE PPP-ACNC: 92 % NORMAL — SIGNIFICANT CHANGE UP (ref 60–140)

## 2020-07-19 PROBLEM — Z00.00 ENCOUNTER FOR PREVENTIVE HEALTH EXAMINATION: Status: ACTIVE | Noted: 2020-07-19

## 2020-07-20 ENCOUNTER — APPOINTMENT (OUTPATIENT)
Dept: HEMATOLOGY ONCOLOGY | Facility: CLINIC | Age: 50
End: 2020-07-20
Payer: COMMERCIAL

## 2020-07-20 VITALS
RESPIRATION RATE: 16 BRPM | SYSTOLIC BLOOD PRESSURE: 153 MMHG | TEMPERATURE: 96.8 F | HEIGHT: 69 IN | WEIGHT: 270 LBS | HEART RATE: 91 BPM | DIASTOLIC BLOOD PRESSURE: 99 MMHG | BODY MASS INDEX: 39.99 KG/M2

## 2020-07-20 DIAGNOSIS — Z80.41 FAMILY HISTORY OF MALIGNANT NEOPLASM OF OVARY: ICD-10-CM

## 2020-07-20 DIAGNOSIS — Z80.0 FAMILY HISTORY OF MALIGNANT NEOPLASM OF DIGESTIVE ORGANS: ICD-10-CM

## 2020-07-20 DIAGNOSIS — F17.200 NICOTINE DEPENDENCE, UNSPECIFIED, UNCOMPLICATED: ICD-10-CM

## 2020-07-20 DIAGNOSIS — Z80.1 FAMILY HISTORY OF MALIGNANT NEOPLASM OF TRACHEA, BRONCHUS AND LUNG: ICD-10-CM

## 2020-07-20 PROCEDURE — 99215 OFFICE O/P EST HI 40 MIN: CPT

## 2020-07-20 NOTE — HISTORY OF PRESENT ILLNESS
[de-identified] : 49yo female with no significant PMH was seen in the hospital as a consult for splenic infarct. \par She was admitted on 7/2/20 for c/o abdominal pain and pleuritic chest pain and work up at that time revealed splenic infarct. She was on lovenox inpatient and was then discharged on eliquis which is compliant and tolerating with no complaints. Post hospital discharge she is doing well with no complaints and her abdominal pain resolved. No c/o poor appetite/weight loss. NO c/o bruising or bleeding. \par  She has no prior VTE events. No h/o known abnormal hear rhythms or AFib. She had prior Sx with CCY and C section and post op events were unremarkable with no clots. \par She has 2 children, no prior miscarriages. \par She is an active smoker- 1PPD> 15 years and has now cut down to 8 cigarettes a day. \par Up to date with colonoscopy and mammogram. Never had a colonoscopy \par \par Labs- WBC-11.9, Hb 14, Plt- 254, MCV-254. creatinine normal and LFT normal. D dimer- 474. COVID IgG negative. \par Hypercoag work up unremarkable except for elevated anti cardiolipin IgM-22. LA- negative, [id number] negative, PT gene mutation, CYNTHIA 2 negative, Anti thrombin III, Protein C assay normal. \par \par Radiology- 7/3/20 CT abdomen pelvis with contrast  - Left basilar consolidation/atelectasis and trace effusion. Findings may reflect pneumonia superimposed on atelectasis.\par Splenic infarction, as above. Main splenic artery is patent. Nonspecific periportal lymphadenopathy.\par CT chest- IMPRESSION:No CT evidence of acute pulmonary embolus. Findings suspicious for splenic infarct seen medially Nonspecific bibasilar ground glass opacities.\par Nonspecific prominent periportal lymph node measuring 1.6 x 3.3 cm. Outpatient abdominal pelvic CT is recommended for further evaluation. Right upper lobe 2 mm pulmonary nodule. Follow-up noncontrast chest CT is recommended in 12 months.\par

## 2020-07-20 NOTE — ASSESSMENT
[FreeTextEntry1] : 50 year old female with no comorbidities was found to have splenic infarct. Hypercoagulable work up negative except for mild elevation of anti cardiolipin IgM of 22. \par \par 1. Splenic infarct on AC with Eliquis \par Anticardiolipin IgM mildly elevated at 22( non specific) with normal LA and Anti Beta 2 GP. \par \par She needs cardiology evaluation- Will order Echo and she needs to see cardiology - Afib needs to ruled out. \par Will check Factor V Leiden\par Will repeat APS panel in 12 weeks (October 2020)\par She was advised to follow with GI for screening colonoscopy \par \par 2. Active smoker- Counselled on smoking cessation. \par 3. 2mm RT UL lung nodule- repeat CT chest in 1 year (July 2021)\par 4. Lt adnexal cyst noted on CT and non specific hilary portal LN enlargement- Check . GYB eval.\par \par RTC in 2 months \par \par Patient was seen and examined and plan discussed with Dr Saha \par \par

## 2020-07-21 LAB — CANCER AG125 SERPL-ACNC: 14 U/ML

## 2020-07-30 LAB — DNA PLOIDY SPEC FC-IMP: NORMAL

## 2020-08-06 ENCOUNTER — APPOINTMENT (OUTPATIENT)
Dept: GASTROENTEROLOGY | Facility: CLINIC | Age: 50
End: 2020-08-06
Payer: COMMERCIAL

## 2020-08-06 DIAGNOSIS — Z12.11 ENCOUNTER FOR SCREENING FOR MALIGNANT NEOPLASM OF COLON: ICD-10-CM

## 2020-08-06 DIAGNOSIS — R93.5 ABNORMAL FINDINGS ON DIAGNOSTIC IMAGING OF OTHER ABDOMINAL REGIONS, INCLUDING RETROPERITONEUM: ICD-10-CM

## 2020-08-06 PROCEDURE — 99204 OFFICE O/P NEW MOD 45 MIN: CPT | Mod: 95

## 2020-08-06 RX ORDER — APIXABAN 5 MG/1
5 TABLET, FILM COATED ORAL
Qty: 60 | Refills: 3 | Status: DISCONTINUED | COMMUNITY
Start: 2020-07-20 | End: 2020-08-06

## 2020-08-06 NOTE — HISTORY OF PRESENT ILLNESS
[Home] : at home, [unfilled] , at the time of the visit. [Verbal consent obtained from patient] : the patient, [unfilled] [Medical Office: ([facility])___] : at the medical office located in  [FreeTextEntry4] : Cee Cowan [de-identified] : 50 year old female pt increased risk for CRC, active smoker, who had recent abdominal pain evaluated at ED and was found to have splenic infarct, now on eliquis, being worked for hypercoagulable states by Dr Garvey. Pain resolved but pt is still complaining of post prandial bloating and abdominal distention, no pain or weight loss. \par Reports regular BMs, no blood in stools, or UGI complaints. \par As well on CT scan she was found to have a pulmonary nodule, and intra abdominal lymphadenopathy, being worked by Dr Garvey. \par Was supposed to start CRC screening at 42 years (strong fam hx of CRC).

## 2020-08-06 NOTE — PHYSICAL EXAM
[Hearing Threshold Finger Rub Not Cass] : hearing was normal [General Appearance - Alert] : alert [] : no respiratory distress [Oriented To Time, Place, And Person] : oriented to person, place, and time

## 2020-08-06 NOTE — ASSESSMENT
[FreeTextEntry1] : 50 year old female pt increased risk for CRC, active smoker, who had recent abdominal pain evaluated at ED and was found to have splenic infarct, now on eliquis, being worked for hypercoagulable states by Dr Garvey. Pain resolved but pt is still complaining of post prandial bloating and abdominal distention, no pain or weight loss. \par Reports regular BMs, no blood in stools, or UGI complaints. \par As well on CT scan she was found to have a pulmonary nodule, and intra abdominal lymphadenopathy, being worked by Dr Garvey. \par Was supposed to start CRC screening at 42 years (strong fam hx of CRC). \par \par Post prandial bloating/ increased risk for CRC\par needs EGD, screening colonoscopy\par Risks and benefits discussed with patient.\par Hold eliquis 2 days PTP\par Pulm eval for pulm nodule\par F/Up with Dr Garvey for intra abdom LN among others.

## 2020-08-17 ENCOUNTER — APPOINTMENT (OUTPATIENT)
Dept: HEMATOLOGY ONCOLOGY | Facility: CLINIC | Age: 50
End: 2020-08-17
Payer: COMMERCIAL

## 2020-08-17 VITALS
BODY MASS INDEX: 41.03 KG/M2 | TEMPERATURE: 98.4 F | SYSTOLIC BLOOD PRESSURE: 140 MMHG | DIASTOLIC BLOOD PRESSURE: 79 MMHG | WEIGHT: 277 LBS | HEIGHT: 69 IN | HEART RATE: 76 BPM

## 2020-08-17 PROCEDURE — 99214 OFFICE O/P EST MOD 30 MIN: CPT

## 2020-08-21 NOTE — HISTORY OF PRESENT ILLNESS
[de-identified] : 49yo female with no significant PMH was seen in the hospital as a consult for splenic infarct. \par She was admitted on 7/2/20 for c/o abdominal pain and pleuritic chest pain and work up at that time revealed splenic infarct. She was on lovenox inpatient and was then discharged on eliquis which is compliant and tolerating with no complaints. Post hospital discharge she is doing well with no complaints and her abdominal pain resolved. No c/o poor appetite/weight loss. NO c/o bruising or bleeding. \par  She has no prior VTE events. No h/o known abnormal hear rhythms or AFib. She had prior Sx with CCY and C section and post op events were unremarkable with no clots. \par She has 2 children, no prior miscarriages. \par She is an active smoker- 1PPD> 15 years and has now cut down to 8 cigarettes a day. \par Up to date with colonoscopy and mammogram. Never had a colonoscopy \par \par Labs- WBC-11.9, Hb 14, Plt- 254, MCV-254. creatinine normal and LFT normal. D dimer- 474. COVID IgG negative. \par Hypercoag work up unremarkable except for elevated anti cardiolipin IgM-22. LA- negative, Fldc8HE negative, PT gene mutation, CYNTHIA 2 negative, Anti thrombin III, Protein C assay normal. \par \par Radiology- 7/3/20 CT abdomen pelvis with contrast  - Left basilar consolidation/atelectasis and trace effusion. Findings may reflect pneumonia superimposed on atelectasis.\par Splenic infarction, as above. Main splenic artery is patent. Nonspecific periportal lymphadenopathy.\par CT chest- IMPRESSION:No CT evidence of acute pulmonary embolus. Findings suspicious for splenic infarct seen medially Nonspecific bibasilar ground glass opacities.\par Nonspecific prominent periportal lymph node measuring 1.6 x 3.3 cm. Outpatient abdominal pelvic CT is recommended for further evaluation. Right upper lobe 2 mm pulmonary nodule. Follow-up noncontrast chest CT is recommended in 12 months.\par  [de-identified] : 8/17/20- Valerie is here for follow up. She is compliant with her eliquis. No c/o easy bruising, bleeding. She is doing well with no new complaints. No c/o abdominal pain \par She is scheduled for colono/EGD on September 4th and is here to discuss when to start holding her eliquis. \par  She is also scheduled for stress test/ ?monitor placement by cardiology as well.

## 2020-08-21 NOTE — ASSESSMENT
[FreeTextEntry1] : 50 year old female with no comorbidities was found to have splenic infarct. Hypercoagulable work up negative except for mild elevation of anti cardiolipin IgM of 22. \par \par 1. Splenic infarct on AC with Eliquis \par Anticardiolipin IgM mildly elevated at 22( non specific) with normal LA and Anti Beta 2 GP. \par Factor V Leiden negative as well \par Echo from July 2020 shows normal EF\par \par She is scheduled for stress test and ?holter monitor placement by cardiology \par Will repeat APS panel in 12 weeks (October 2020)\par If cardio work up is negative for arrhythmia we might most likely stop AC in 3 months and keep her on ASA. \par \par \par Scheduled for EGD/colonoscopy with GI  on September 4th. \par She can hold eliquis 48hrs prior to procedure and can be started on the night of the procedure if no increased risk of bleeding. \par \par \par 2. Active smoker- Counselled on smoking cessation. \par 3. 2mm RT UL lung nodule- repeat CT chest in 1 year (July 2021)\par 4. Lt adnexal cyst noted on CT and non specific hilary portal LN enlargement-  normal. \par She is going to follow with Gynecology \par \par RTC in 1 month \par \par Patient was seen and examined and plan discussed with Dr Saha \par \par

## 2020-09-01 ENCOUNTER — LABORATORY RESULT (OUTPATIENT)
Age: 50
End: 2020-09-01

## 2020-09-01 ENCOUNTER — OUTPATIENT (OUTPATIENT)
Dept: OUTPATIENT SERVICES | Facility: HOSPITAL | Age: 50
LOS: 1 days | Discharge: HOME | End: 2020-09-01

## 2020-09-01 DIAGNOSIS — Z90.49 ACQUIRED ABSENCE OF OTHER SPECIFIED PARTS OF DIGESTIVE TRACT: Chronic | ICD-10-CM

## 2020-09-02 DIAGNOSIS — Z11.59 ENCOUNTER FOR SCREENING FOR OTHER VIRAL DISEASES: ICD-10-CM

## 2020-09-04 ENCOUNTER — RESULT REVIEW (OUTPATIENT)
Age: 50
End: 2020-09-04

## 2020-09-04 ENCOUNTER — TRANSCRIPTION ENCOUNTER (OUTPATIENT)
Age: 50
End: 2020-09-04

## 2020-09-04 ENCOUNTER — OUTPATIENT (OUTPATIENT)
Dept: OUTPATIENT SERVICES | Facility: HOSPITAL | Age: 50
LOS: 1 days | Discharge: HOME | End: 2020-09-04
Payer: COMMERCIAL

## 2020-09-04 VITALS — HEART RATE: 67 BPM | DIASTOLIC BLOOD PRESSURE: 84 MMHG | RESPIRATION RATE: 17 BRPM | SYSTOLIC BLOOD PRESSURE: 118 MMHG

## 2020-09-04 VITALS
HEART RATE: 87 BPM | WEIGHT: 272.05 LBS | HEIGHT: 69 IN | DIASTOLIC BLOOD PRESSURE: 85 MMHG | TEMPERATURE: 97 F | SYSTOLIC BLOOD PRESSURE: 131 MMHG | RESPIRATION RATE: 17 BRPM | OXYGEN SATURATION: 98 %

## 2020-09-04 DIAGNOSIS — Z90.49 ACQUIRED ABSENCE OF OTHER SPECIFIED PARTS OF DIGESTIVE TRACT: Chronic | ICD-10-CM

## 2020-09-04 DIAGNOSIS — Z98.890 OTHER SPECIFIED POSTPROCEDURAL STATES: Chronic | ICD-10-CM

## 2020-09-04 PROCEDURE — 45385 COLONOSCOPY W/LESION REMOVAL: CPT

## 2020-09-04 PROCEDURE — 43239 EGD BIOPSY SINGLE/MULTIPLE: CPT | Mod: XS

## 2020-09-04 PROCEDURE — 88305 TISSUE EXAM BY PATHOLOGIST: CPT | Mod: 26

## 2020-09-04 PROCEDURE — 88312 SPECIAL STAINS GROUP 1: CPT | Mod: 26

## 2020-09-04 PROCEDURE — 45380 COLONOSCOPY AND BIOPSY: CPT | Mod: XU

## 2020-09-04 NOTE — ASU DISCHARGE PLAN (ADULT/PEDIATRIC) - CALL YOUR DOCTOR IF YOU HAVE ANY OF THE FOLLOWING:
Fever greater than (need to indicate Fahrenheit or Celsius)/Pain not relieved by Medications/Nausea and vomiting that does not stop/Inability to tolerate liquids or foods/Bleeding that does not stop/Excessive diarrhea

## 2020-09-04 NOTE — CHART NOTE - NSCHARTNOTEFT_GEN_A_CORE
PACU ANESTHESIA ADMISSION NOTE      Procedure:   Post op diagnosis:      ____  Intubated  TV:______       Rate: ______      FiO2: ______    _x___  Patent Airway    __x__  Full return of protective reflexes    ___x_  Full recovery from anesthesia / back to baseline     Vitals:   T:           R:16                  BP: 150/94                 Sat:   96                P: 78      Mental Status:  _x___ Awake   _x____ Alert   _____ Drowsy   _____ Sedated    Nausea/Vomiting:  ____ NO  ______Yes,   See Post - Op Orders          Pain Scale (0-10):  _____    Treatment: ____ None    ____ See Post - Op/PCA Orders    Post - Operative Fluids:   ____ Oral   ____ See Post - Op Orders    Plan: Discharge:   x____Home       _____Floor     _____Critical Care    _____  Other:_________________    Comments:

## 2020-09-04 NOTE — H&P PST ADULT - HISTORY OF PRESENT ILLNESS
51 yo female increased risk CRC active smoker presents with post prandial bloating and abdominal distention no pain or weight loss. Given increased risk of CRC and post prandial bloating plan for EGD/Colonoscopy today

## 2020-09-08 LAB
SURGICAL PATHOLOGY STUDY: SIGNIFICANT CHANGE UP
SURGICAL PATHOLOGY STUDY: SIGNIFICANT CHANGE UP

## 2020-09-09 DIAGNOSIS — R10.13 EPIGASTRIC PAIN: ICD-10-CM

## 2020-09-09 DIAGNOSIS — Z12.11 ENCOUNTER FOR SCREENING FOR MALIGNANT NEOPLASM OF COLON: ICD-10-CM

## 2020-09-09 DIAGNOSIS — K64.4 RESIDUAL HEMORRHOIDAL SKIN TAGS: ICD-10-CM

## 2020-09-09 DIAGNOSIS — D12.0 BENIGN NEOPLASM OF CECUM: ICD-10-CM

## 2020-09-09 DIAGNOSIS — K62.1 RECTAL POLYP: ICD-10-CM

## 2020-09-09 DIAGNOSIS — K57.30 DIVERTICULOSIS OF LARGE INTESTINE WITHOUT PERFORATION OR ABSCESS WITHOUT BLEEDING: ICD-10-CM

## 2020-09-09 DIAGNOSIS — K29.50 UNSPECIFIED CHRONIC GASTRITIS WITHOUT BLEEDING: ICD-10-CM

## 2020-09-09 DIAGNOSIS — Z20.828 CONTACT WITH AND (SUSPECTED) EXPOSURE TO OTHER VIRAL COMMUNICABLE DISEASES: ICD-10-CM

## 2020-09-09 DIAGNOSIS — B96.81 HELICOBACTER PYLORI [H. PYLORI] AS THE CAUSE OF DISEASES CLASSIFIED ELSEWHERE: ICD-10-CM

## 2020-09-09 DIAGNOSIS — Z90.49 ACQUIRED ABSENCE OF OTHER SPECIFIED PARTS OF DIGESTIVE TRACT: ICD-10-CM

## 2020-09-09 DIAGNOSIS — K20.9 ESOPHAGITIS, UNSPECIFIED: ICD-10-CM

## 2020-09-09 DIAGNOSIS — Z79.01 LONG TERM (CURRENT) USE OF ANTICOAGULANTS: ICD-10-CM

## 2020-09-18 PROBLEM — S36.029A: Chronic | Status: ACTIVE | Noted: 2020-09-04

## 2020-09-21 ENCOUNTER — APPOINTMENT (OUTPATIENT)
Dept: HEMATOLOGY ONCOLOGY | Facility: CLINIC | Age: 50
End: 2020-09-21
Payer: COMMERCIAL

## 2020-09-21 VITALS
HEIGHT: 69 IN | TEMPERATURE: 97.3 F | SYSTOLIC BLOOD PRESSURE: 163 MMHG | BODY MASS INDEX: 40.43 KG/M2 | HEART RATE: 76 BPM | DIASTOLIC BLOOD PRESSURE: 85 MMHG | WEIGHT: 273 LBS

## 2020-09-21 PROCEDURE — 99214 OFFICE O/P EST MOD 30 MIN: CPT

## 2020-09-21 NOTE — ASSESSMENT
[FreeTextEntry1] : 50 year old female with no comorbidities was found to have splenic infarct. Hypercoagulable work up negative except for mild elevation of anti cardiolipin IgM of 22. \par \par 1. Splenic infarct on AC with Eliquis \par Anticardiolipin IgM mildly elevated at 22( non specific) with normal LA and Anti Beta 2 GP. \par Factor V Leiden negative and PT gene mutation negative as well \par Echo from July 2020 shows normal EF\par \par She had stress test- normal as per pt. She did not have monitor placed yet and has follow up with cardiology. \par Will repeat APS panel in 12 weeks (October 2020)\par If cardio work up is negative for arrhythmia we might most likely stop AC in 3 months and keep her on ASA 81 mg. \par \par s/p EGD and colonoscopy- hyperplastic polyps and Hpylori+- has appt to f/u with GI. \par \par \par \par 2. Active smoker- Counselled on smoking cessation. She continues to smoke. \par 3. 2mm RT UL lung nodule- repeat CT chest in 1 year (July 2021)\par 4. Lt adnexal cyst noted on CT and non specific hilary portal LN enlargement-  normal. \par Follows with Gynecology \par \par RTC in 1 month \par \par Patient was seen and examined and plan discussed with Dr Saha \par \par

## 2020-09-21 NOTE — HISTORY OF PRESENT ILLNESS
[de-identified] : 51yo female with no significant PMH was seen in the hospital as a consult for splenic infarct. \par She was admitted on 7/2/20 for c/o abdominal pain and pleuritic chest pain and work up at that time revealed splenic infarct. She was on lovenox inpatient and was then discharged on eliquis which is compliant and tolerating with no complaints. Post hospital discharge she is doing well with no complaints and her abdominal pain resolved. No c/o poor appetite/weight loss. NO c/o bruising or bleeding. \par  She has no prior VTE events. No h/o known abnormal hear rhythms or AFib. She had prior Sx with CCY and C section and post op events were unremarkable with no clots. \par She has 2 children, no prior miscarriages. \par She is an active smoker- 1PPD> 15 years and has now cut down to 8 cigarettes a day. \par Up to date with colonoscopy and mammogram. Never had a colonoscopy \par \par Labs- WBC-11.9, Hb 14, Plt- 254, MCV-254. creatinine normal and LFT normal. D dimer- 474. COVID IgG negative. \par Hypercoag work up unremarkable except for elevated anti cardiolipin IgM-22. LA- negative, [id number] negative, PT gene mutation, CYNTHIA 2 negative, Anti thrombin III, Protein C assay normal. \par \par Radiology- 7/3/20 CT abdomen pelvis with contrast  - Left basilar consolidation/atelectasis and trace effusion. Findings may reflect pneumonia superimposed on atelectasis.\par Splenic infarction, as above. Main splenic artery is patent. Nonspecific periportal lymphadenopathy.\par CT chest- IMPRESSION:No CT evidence of acute pulmonary embolus. Findings suspicious for splenic infarct seen medially Nonspecific bibasilar ground glass opacities.\par Nonspecific prominent periportal lymph node measuring 1.6 x 3.3 cm. Outpatient abdominal pelvic CT is recommended for further evaluation. Right upper lobe 2 mm pulmonary nodule. Follow-up noncontrast chest CT is recommended in 12 months.\par  [de-identified] : 8/17/20- Valerie is here for follow up. She is compliant with her eliquis. No c/o easy bruising, bleeding. She is doing well with no new complaints. No c/o abdominal pain \par She is scheduled for colono/EGD on September 4th and is here to discuss when to start holding her eliquis. \par  She is also scheduled for stress test/ ?monitor placement by cardiology as well.\par \par 9/21/20- Patient is here for follow up. She is doing well on eliquis with no complaints. No c/o bleeding or easy bruising. Her periods are not heavy. She had colono and EGD and was found to have hyperplastic polyps and gastritis and positive for Hpylori. She has an appt in 2 weeks to f/u GI and has not started treatment for Hpylori yet

## 2020-10-06 RX ORDER — POLYETHYLENE GLYCOL 3350 AND ELECTROLYTES WITH LEMON FLAVOR 236; 22.74; 6.74; 5.86; 2.97 G/4L; G/4L; G/4L; G/4L; G/4L
236 POWDER, FOR SOLUTION ORAL
Qty: 1 | Refills: 0 | Status: DISCONTINUED | COMMUNITY
Start: 2020-08-06 | End: 2020-10-06

## 2020-10-07 ENCOUNTER — APPOINTMENT (OUTPATIENT)
Dept: GASTROENTEROLOGY | Facility: CLINIC | Age: 50
End: 2020-10-07
Payer: COMMERCIAL

## 2020-10-07 DIAGNOSIS — R14.0 ABDOMINAL DISTENSION (GASEOUS): ICD-10-CM

## 2020-10-07 DIAGNOSIS — A04.8 OTHER SPECIFIED BACTERIAL INTESTINAL INFECTIONS: ICD-10-CM

## 2020-10-07 PROCEDURE — 99214 OFFICE O/P EST MOD 30 MIN: CPT | Mod: 95

## 2020-10-07 NOTE — HISTORY OF PRESENT ILLNESS
[Home] : at home, [unfilled] , at the time of the visit. [Medical Office: ([facility])___] : at the medical office located in  [Verbal consent obtained from patient] : the patient, [unfilled] [FreeTextEntry4] : Cee Cowan [de-identified] : 50 year old female pt increased risk for CRC, active smoker, who had recent abdominal pain evaluated at ED and was found to have splenic infarct, now on eliquis, being worked for hypercoagulable states by Dr Garvey. , no pain or weight loss. \par Reports regular BMs, no blood in stools, or UGI complaints. \par As well on CT scan she was found to have a pulmonary nodule, and intra abdominal lymphadenopathy, being worked by Dr Garvey. \par Was supposed to start CRC screening at 42 years (strong fam hx of CRC).\par Presents today for follow-up of EGD/Colonoscopy \par EGD biopsy positive for H-Pylori severe gastritis with IM of antrum\par Duodenal biopsy negative for CD\par Colonoscopy showed 7 polyps with TAs among others\par \par

## 2020-10-07 NOTE — ASSESSMENT
[FreeTextEntry1] : 50 year old female pt increased risk for CRC, active smoker, who had recent abdominal pain evaluated at ED and was found to have splenic infarct, now on eliquis, being worked for hypercoagulable states by Dr Garvey. Pain resolved but pt is still complaining of post prandial bloating and abdominal distention, no pain or weight loss. \par Reports regular BMs, no blood in stools, or UGI complaints. \par As well on CT scan she was found to have a pulmonary nodule, and intra abdominal lymphadenopathy, being worked by Dr Garvey. \par Was supposed to start CRC screening at 42 years (strong fam hx of CRC). \par \par Post prandial bloating/ increased risk for CRC\par s/p EGD H-pylori positive with IM of antrum\par -Triple abx therapy, followed by Fecal testing for H-pylori eradication confirmation 3 weeks later\par - needs EGD with antral mapping in 6 months (IM of cardia) \par call back for stool results\par \par s/p screening colonoscopy that showed 7 polyps: HP/TAs \par next colonoscopy advised in 2023\par \par F/Up with Dr Garvey for intra abdom LN among others. \par

## 2020-10-25 ENCOUNTER — LABORATORY RESULT (OUTPATIENT)
Age: 50
End: 2020-10-25

## 2020-10-26 ENCOUNTER — OUTPATIENT (OUTPATIENT)
Dept: OUTPATIENT SERVICES | Facility: HOSPITAL | Age: 50
LOS: 1 days | Discharge: HOME | End: 2020-10-26

## 2020-10-26 ENCOUNTER — APPOINTMENT (OUTPATIENT)
Dept: HEMATOLOGY ONCOLOGY | Facility: CLINIC | Age: 50
End: 2020-10-26
Payer: COMMERCIAL

## 2020-10-26 VITALS
HEART RATE: 80 BPM | TEMPERATURE: 98.6 F | BODY MASS INDEX: 41.03 KG/M2 | HEIGHT: 69 IN | DIASTOLIC BLOOD PRESSURE: 82 MMHG | SYSTOLIC BLOOD PRESSURE: 143 MMHG | WEIGHT: 277 LBS

## 2020-10-26 DIAGNOSIS — R76.0 RAISED ANTIBODY TITER: ICD-10-CM

## 2020-10-26 DIAGNOSIS — D73.5 INFARCTION OF SPLEEN: ICD-10-CM

## 2020-10-26 DIAGNOSIS — Z90.49 ACQUIRED ABSENCE OF OTHER SPECIFIED PARTS OF DIGESTIVE TRACT: Chronic | ICD-10-CM

## 2020-10-26 DIAGNOSIS — Z98.890 OTHER SPECIFIED POSTPROCEDURAL STATES: Chronic | ICD-10-CM

## 2020-10-26 PROCEDURE — 99214 OFFICE O/P EST MOD 30 MIN: CPT

## 2020-10-26 NOTE — ASSESSMENT
[FreeTextEntry1] : 50 year old female with no comorbidities was found to have splenic infarct. Hypercoagulable work up negative except for mild elevation of anti cardiolipin IgM of 22. \par \par 1. Splenic infarct on AC with Eliquis \par Anticardiolipin IgM mildly elevated at 22( non specific) with normal LA and Anti Beta 2 GP. \par Factor V Leiden negative and PT gene mutation negative as well \par Echo from July 2020 shows normal EF\par \par She had stress test- normal as per pt. She did not have monitor placed yet and has follow up with cardiology in 2 months \par Will repeat APS panel again today \par If work up negative or non specific plan is to DC eliquis and give ASA 81 mg \par \par On triple therapy for H pylori \par \par \par \par 2. Active smoker- Counselled on smoking cessation. She continues to smoke. \par 3. 2mm RT UL lung nodule- repeat CT chest in 1 year (July 2021)\par 4. Lt adnexal cyst noted on CT and non specific hilary portal LN enlargement-  normal. \par Follows with Gynecology \par \par RTC in 6 weeks \par \par Patient was seen and examined and plan discussed with Dr Saha \par \par

## 2020-10-26 NOTE — HISTORY OF PRESENT ILLNESS
[de-identified] : 51yo female with no significant PMH was seen in the hospital as a consult for splenic infarct. \par She was admitted on 7/2/20 for c/o abdominal pain and pleuritic chest pain and work up at that time revealed splenic infarct. She was on lovenox inpatient and was then discharged on eliquis which is compliant and tolerating with no complaints. Post hospital discharge she is doing well with no complaints and her abdominal pain resolved. No c/o poor appetite/weight loss. NO c/o bruising or bleeding. \par  She has no prior VTE events. No h/o known abnormal hear rhythms or AFib. She had prior Sx with CCY and C section and post op events were unremarkable with no clots. \par She has 2 children, no prior miscarriages. \par She is an active smoker- 1PPD> 15 years and has now cut down to 8 cigarettes a day. \par Up to date with colonoscopy and mammogram. Never had a colonoscopy \par \par Labs- WBC-11.9, Hb 14, Plt- 254, MCV-254. creatinine normal and LFT normal. D dimer- 474. COVID IgG negative. \par Hypercoag work up unremarkable except for elevated anti cardiolipin IgM-22. LA- negative, Rwmd4NU negative, PT gene mutation, CYNTHIA 2 negative, Anti thrombin III, Protein C assay normal. \par \par Radiology- 7/3/20 CT abdomen pelvis with contrast  - Left basilar consolidation/atelectasis and trace effusion. Findings may reflect pneumonia superimposed on atelectasis.\par Splenic infarction, as above. Main splenic artery is patent. Nonspecific periportal lymphadenopathy.\par CT chest- IMPRESSION:No CT evidence of acute pulmonary embolus. Findings suspicious for splenic infarct seen medially Nonspecific bibasilar ground glass opacities.\par Nonspecific prominent periportal lymph node measuring 1.6 x 3.3 cm. Outpatient abdominal pelvic CT is recommended for further evaluation. Right upper lobe 2 mm pulmonary nodule. Follow-up noncontrast chest CT is recommended in 12 months.\par  [de-identified] : 8/17/20- Valerie is here for follow up. She is compliant with her eliquis. No c/o easy bruising, bleeding. She is doing well with no new complaints. No c/o abdominal pain \par She is scheduled for colono/EGD on September 4th and is here to discuss when to start holding her eliquis. \par  She is also scheduled for stress test/ ?monitor placement by cardiology as well.\par \par 9/21/20- Patient is here for follow up. She is doing well on eliquis with no complaints. No c/o bleeding or easy bruising. Her periods are not heavy. She had colono and EGD and was found to have hyperplastic polyps, tubular adenoma and gastritis and positive for Hpylori. She has an appt in 2 weeks to f/u GI and has not started treatment for Hpylori yet\par \par 10/26/20- Patient is here fir follow up with no new complaints. She started triple therapy for H pylori and is going to get repeat EGD in April 2021. No c/o abdominal pain and she is complaint with eliquis

## 2020-10-27 LAB
CONFIRM: 31 SEC
DRVVT IMM 1:2 NP PPP: NORMAL
DRVVT SCREEN TO CONFIRM RATIO: 0.84 RATIO
SCREEN DRVVT: 28.9 SEC
SILICA CLOTTING TIME INTERPRETATION: NORMAL
SILICA CLOTTING TIME S/C: 0.86 RATIO

## 2020-10-28 LAB
B2 GLYCOPROT1 IGA SERPL IA-ACNC: 9.7 SAU
B2 GLYCOPROT1 IGG SER-ACNC: 8.8 SGU
B2 GLYCOPROT1 IGM SER-ACNC: 9.6 SMU

## 2020-10-29 LAB — CARDIOLIPIN IGM SER-MCNC: 19 MPL

## 2020-10-30 LAB — CARDIOLIPIN IGM SER-MCNC: <5 GPL

## 2020-12-10 ENCOUNTER — APPOINTMENT (OUTPATIENT)
Dept: HEMATOLOGY ONCOLOGY | Facility: CLINIC | Age: 50
End: 2020-12-10
Payer: COMMERCIAL

## 2020-12-10 ENCOUNTER — OUTPATIENT (OUTPATIENT)
Dept: OUTPATIENT SERVICES | Facility: HOSPITAL | Age: 50
LOS: 1 days | Discharge: HOME | End: 2020-12-10

## 2020-12-10 VITALS
TEMPERATURE: 97.9 F | HEIGHT: 69 IN | WEIGHT: 275 LBS | RESPIRATION RATE: 16 BRPM | BODY MASS INDEX: 40.73 KG/M2 | SYSTOLIC BLOOD PRESSURE: 153 MMHG | DIASTOLIC BLOOD PRESSURE: 79 MMHG | HEART RATE: 71 BPM

## 2020-12-10 DIAGNOSIS — Z98.890 OTHER SPECIFIED POSTPROCEDURAL STATES: Chronic | ICD-10-CM

## 2020-12-10 DIAGNOSIS — D73.5 INFARCTION OF SPLEEN: ICD-10-CM

## 2020-12-10 DIAGNOSIS — Z90.49 ACQUIRED ABSENCE OF OTHER SPECIFIED PARTS OF DIGESTIVE TRACT: Chronic | ICD-10-CM

## 2020-12-10 DIAGNOSIS — R76.0 RAISED ANTIBODY TITER: ICD-10-CM

## 2020-12-10 PROCEDURE — 99214 OFFICE O/P EST MOD 30 MIN: CPT

## 2020-12-10 NOTE — HISTORY OF PRESENT ILLNESS
[de-identified] : 51yo female with no significant PMH was seen in the hospital as a consult for splenic infarct. \par She was admitted on 7/2/20 for c/o abdominal pain and pleuritic chest pain and work up at that time revealed splenic infarct. She was on lovenox inpatient and was then discharged on eliquis which is compliant and tolerating with no complaints. Post hospital discharge she is doing well with no complaints and her abdominal pain resolved. No c/o poor appetite/weight loss. NO c/o bruising or bleeding. \par  She has no prior VTE events. No h/o known abnormal hear rhythms or AFib. She had prior Sx with CCY and C section and post op events were unremarkable with no clots. \par She has 2 children, no prior miscarriages. \par She is an active smoker- 1PPD> 15 years and has now cut down to 8 cigarettes a day. \par Up to date with colonoscopy and mammogram. Never had a colonoscopy \par \par Labs- WBC-11.9, Hb 14, Plt- 254, MCV-254. creatinine normal and LFT normal. D dimer- 474. COVID IgG negative. \par Hypercoag work up unremarkable except for elevated anti cardiolipin IgM-22. LA- negative, Xgtv7PE negative, PT gene mutation, CYNTHIA 2 negative, Anti thrombin III, Protein C assay normal. \par \par Radiology- 7/3/20 CT abdomen pelvis with contrast  - Left basilar consolidation/atelectasis and trace effusion. Findings may reflect pneumonia superimposed on atelectasis.\par Splenic infarction, as above. Main splenic artery is patent. Nonspecific periportal lymphadenopathy.\par CT chest- IMPRESSION:No CT evidence of acute pulmonary embolus. Findings suspicious for splenic infarct seen medially Nonspecific bibasilar ground glass opacities.\par Nonspecific prominent periportal lymph node measuring 1.6 x 3.3 cm. Outpatient abdominal pelvic CT is recommended for further evaluation. Right upper lobe 2 mm pulmonary nodule. Follow-up noncontrast chest CT is recommended in 12 months.\par  [de-identified] : 8/17/20- Valerie is here for follow up. She is compliant with her eliquis. No c/o easy bruising, bleeding. She is doing well with no new complaints. No c/o abdominal pain \par She is scheduled for colono/EGD on September 4th and is here to discuss when to start holding her eliquis. \par  She is also scheduled for stress test/ ?monitor placement by cardiology as well.\par \par 9/21/20- Patient is here for follow up. She is doing well on eliquis with no complaints. No c/o bleeding or easy bruising. Her periods are not heavy. She had colono and EGD and was found to have hyperplastic polyps, tubular adenoma and gastritis and positive for Hpylori. She has an appt in 2 weeks to f/u GI and has not started treatment for Hpylori yet\par \par 10/26/20- Patient is here fir follow up with no new complaints. She started triple therapy for H pylori and is going to get repeat EGD in April 2021. No c/o abdominal pain and she is complaint with eliquis \par \par 12/10/2020\par VALERIE MARR a 50 year F is here today for follow up. She is doing well on eliquis with no complaints. She continues triple therapy for H Pylori. She follow ups with cardiologist this month as well as pulmonary.

## 2020-12-10 NOTE — ASSESSMENT
[FreeTextEntry1] : 50 year old female with no comorbidities was found to have splenic infarct. Hypercoagulable work up negative except for mild elevation of anti cardiolipin IgM of 22. \par \par 1. Splenic infarct on AC with Eliquis. Work up is non specific therefore we will DC eliquis and start ASA 81 mg \par Anticardiolipin IgM mildly elevated at 19( non specific) with normal LA and Anti Beta 2 GP. \par Factor V Leiden negative and PT gene mutation negative as well \par Echo from July 2020 shows normal EF\par \par She had stress test- normal as per pt. She did not have monitor placed yet and has follow up with cardiology this month.\par \par On triple therapy for H pylori \par \par 2. Active smoker- Counselled on smoking cessation. She continues to smoke. \par 3. 2mm RT UL lung nodule- repeat CT chest in 1 year (July 2021)\par 4. Lt adnexal cyst noted on CT and non specific hilary portal LN enlargement-  normal. \par Follows with Gynecology \par \par RTC in 4 months\par \par Patient was seen and examined and plan discussed with Dr Saha \par \par

## 2020-12-29 DIAGNOSIS — D73.5 INFARCTION OF SPLEEN: ICD-10-CM

## 2020-12-29 DIAGNOSIS — R76.0 RAISED ANTIBODY TITER: ICD-10-CM

## 2021-01-11 NOTE — ASU DISCHARGE PLAN (ADULT/PEDIATRIC) - CARE PROVIDER_API CALL
Detail Level: Detailed Niurka Gage (MD)  Gastroenterology  [address] [place] [address]  Phone: [phone]  Fax: [phone]  Follow Up Time:

## 2021-04-15 ENCOUNTER — APPOINTMENT (OUTPATIENT)
Dept: HEMATOLOGY ONCOLOGY | Facility: CLINIC | Age: 51
End: 2021-04-15

## 2021-12-11 ENCOUNTER — EMERGENCY (EMERGENCY)
Facility: HOSPITAL | Age: 51
LOS: 0 days | Discharge: HOME | End: 2021-12-11
Attending: EMERGENCY MEDICINE | Admitting: EMERGENCY MEDICINE
Payer: COMMERCIAL

## 2021-12-11 VITALS
OXYGEN SATURATION: 98 % | DIASTOLIC BLOOD PRESSURE: 77 MMHG | HEART RATE: 94 BPM | HEIGHT: 69 IN | RESPIRATION RATE: 18 BRPM | TEMPERATURE: 97 F | WEIGHT: 240.08 LBS | SYSTOLIC BLOOD PRESSURE: 141 MMHG

## 2021-12-11 DIAGNOSIS — R39.15 URGENCY OF URINATION: ICD-10-CM

## 2021-12-11 DIAGNOSIS — R35.0 FREQUENCY OF MICTURITION: ICD-10-CM

## 2021-12-11 DIAGNOSIS — R10.30 LOWER ABDOMINAL PAIN, UNSPECIFIED: ICD-10-CM

## 2021-12-11 DIAGNOSIS — Z90.49 ACQUIRED ABSENCE OF OTHER SPECIFIED PARTS OF DIGESTIVE TRACT: ICD-10-CM

## 2021-12-11 DIAGNOSIS — Z90.49 ACQUIRED ABSENCE OF OTHER SPECIFIED PARTS OF DIGESTIVE TRACT: Chronic | ICD-10-CM

## 2021-12-11 DIAGNOSIS — K57.92 DIVERTICULITIS OF INTESTINE, PART UNSPECIFIED, WITHOUT PERFORATION OR ABSCESS WITHOUT BLEEDING: ICD-10-CM

## 2021-12-11 DIAGNOSIS — Z98.890 OTHER SPECIFIED POSTPROCEDURAL STATES: Chronic | ICD-10-CM

## 2021-12-11 DIAGNOSIS — F17.200 NICOTINE DEPENDENCE, UNSPECIFIED, UNCOMPLICATED: ICD-10-CM

## 2021-12-11 LAB
ALBUMIN SERPL ELPH-MCNC: 4 G/DL — SIGNIFICANT CHANGE UP (ref 3.5–5.2)
ALP SERPL-CCNC: 148 U/L — HIGH (ref 30–115)
ALT FLD-CCNC: 34 U/L — SIGNIFICANT CHANGE UP (ref 0–41)
ANION GAP SERPL CALC-SCNC: 13 MMOL/L — SIGNIFICANT CHANGE UP (ref 7–14)
APPEARANCE UR: CLEAR — SIGNIFICANT CHANGE UP
AST SERPL-CCNC: 27 U/L — SIGNIFICANT CHANGE UP (ref 0–41)
BACTERIA # UR AUTO: ABNORMAL
BASOPHILS # BLD AUTO: 0.1 K/UL — SIGNIFICANT CHANGE UP (ref 0–0.2)
BASOPHILS NFR BLD AUTO: 0.7 % — SIGNIFICANT CHANGE UP (ref 0–1)
BILIRUB DIRECT SERPL-MCNC: <0.2 MG/DL — SIGNIFICANT CHANGE UP (ref 0–0.3)
BILIRUB INDIRECT FLD-MCNC: >0.4 MG/DL — SIGNIFICANT CHANGE UP (ref 0.2–1.2)
BILIRUB SERPL-MCNC: 0.6 MG/DL — SIGNIFICANT CHANGE UP (ref 0.2–1.2)
BILIRUB UR-MCNC: NEGATIVE — SIGNIFICANT CHANGE UP
BUN SERPL-MCNC: 11 MG/DL — SIGNIFICANT CHANGE UP (ref 10–20)
CALCIUM SERPL-MCNC: 8.9 MG/DL — SIGNIFICANT CHANGE UP (ref 8.5–10.1)
CHLORIDE SERPL-SCNC: 100 MMOL/L — SIGNIFICANT CHANGE UP (ref 98–110)
CO2 SERPL-SCNC: 22 MMOL/L — SIGNIFICANT CHANGE UP (ref 17–32)
COLOR SPEC: YELLOW — SIGNIFICANT CHANGE UP
CREAT SERPL-MCNC: 0.7 MG/DL — SIGNIFICANT CHANGE UP (ref 0.7–1.5)
DIFF PNL FLD: NEGATIVE — SIGNIFICANT CHANGE UP
EOSINOPHIL # BLD AUTO: 0.34 K/UL — SIGNIFICANT CHANGE UP (ref 0–0.7)
EOSINOPHIL NFR BLD AUTO: 2.5 % — SIGNIFICANT CHANGE UP (ref 0–8)
EPI CELLS # UR: ABNORMAL /HPF
GLUCOSE SERPL-MCNC: 122 MG/DL — HIGH (ref 70–99)
GLUCOSE UR QL: NEGATIVE MG/DL — SIGNIFICANT CHANGE UP
HCG SERPL QL: NEGATIVE — SIGNIFICANT CHANGE UP
HCT VFR BLD CALC: 42.5 % — SIGNIFICANT CHANGE UP (ref 37–47)
HGB BLD-MCNC: 14.2 G/DL — SIGNIFICANT CHANGE UP (ref 12–16)
IMM GRANULOCYTES NFR BLD AUTO: 0.4 % — HIGH (ref 0.1–0.3)
KETONES UR-MCNC: NEGATIVE — SIGNIFICANT CHANGE UP
LEUKOCYTE ESTERASE UR-ACNC: NEGATIVE — SIGNIFICANT CHANGE UP
LIDOCAIN IGE QN: 43 U/L — SIGNIFICANT CHANGE UP (ref 7–60)
LYMPHOCYTES # BLD AUTO: 1.93 K/UL — SIGNIFICANT CHANGE UP (ref 1.2–3.4)
LYMPHOCYTES # BLD AUTO: 14.3 % — LOW (ref 20.5–51.1)
MCHC RBC-ENTMCNC: 32.1 PG — HIGH (ref 27–31)
MCHC RBC-ENTMCNC: 33.4 G/DL — SIGNIFICANT CHANGE UP (ref 32–37)
MCV RBC AUTO: 96.2 FL — SIGNIFICANT CHANGE UP (ref 81–99)
MONOCYTES # BLD AUTO: 1.17 K/UL — HIGH (ref 0.1–0.6)
MONOCYTES NFR BLD AUTO: 8.7 % — SIGNIFICANT CHANGE UP (ref 1.7–9.3)
NEUTROPHILS # BLD AUTO: 9.87 K/UL — HIGH (ref 1.4–6.5)
NEUTROPHILS NFR BLD AUTO: 73.4 % — SIGNIFICANT CHANGE UP (ref 42.2–75.2)
NITRITE UR-MCNC: NEGATIVE — SIGNIFICANT CHANGE UP
NRBC # BLD: 0 /100 WBCS — SIGNIFICANT CHANGE UP (ref 0–0)
PH UR: 5.5 — SIGNIFICANT CHANGE UP (ref 5–8)
PLATELET # BLD AUTO: 248 K/UL — SIGNIFICANT CHANGE UP (ref 130–400)
POTASSIUM SERPL-MCNC: 4.3 MMOL/L — SIGNIFICANT CHANGE UP (ref 3.5–5)
POTASSIUM SERPL-SCNC: 4.3 MMOL/L — SIGNIFICANT CHANGE UP (ref 3.5–5)
PROT SERPL-MCNC: 6.8 G/DL — SIGNIFICANT CHANGE UP (ref 6–8)
PROT UR-MCNC: ABNORMAL MG/DL
RBC # BLD: 4.42 M/UL — SIGNIFICANT CHANGE UP (ref 4.2–5.4)
RBC # FLD: 12.4 % — SIGNIFICANT CHANGE UP (ref 11.5–14.5)
SODIUM SERPL-SCNC: 135 MMOL/L — SIGNIFICANT CHANGE UP (ref 135–146)
SP GR SPEC: >=1.03 (ref 1.01–1.03)
UROBILINOGEN FLD QL: 0.2 MG/DL — SIGNIFICANT CHANGE UP
WBC # BLD: 13.47 K/UL — HIGH (ref 4.8–10.8)
WBC # FLD AUTO: 13.47 K/UL — HIGH (ref 4.8–10.8)
WBC UR QL: SIGNIFICANT CHANGE UP /HPF

## 2021-12-11 PROCEDURE — 74177 CT ABD & PELVIS W/CONTRAST: CPT | Mod: 26,MA

## 2021-12-11 PROCEDURE — 99285 EMERGENCY DEPT VISIT HI MDM: CPT

## 2021-12-11 RX ORDER — METRONIDAZOLE 500 MG
1 TABLET ORAL
Qty: 30 | Refills: 0
Start: 2021-12-11 | End: 2021-12-20

## 2021-12-11 RX ORDER — CIPROFLOXACIN LACTATE 400MG/40ML
1 VIAL (ML) INTRAVENOUS
Qty: 20 | Refills: 0
Start: 2021-12-11 | End: 2021-12-20

## 2021-12-11 RX ORDER — MORPHINE SULFATE 50 MG/1
4 CAPSULE, EXTENDED RELEASE ORAL ONCE
Refills: 0 | Status: DISCONTINUED | OUTPATIENT
Start: 2021-12-11 | End: 2021-12-11

## 2021-12-11 RX ORDER — SODIUM CHLORIDE 9 MG/ML
1000 INJECTION INTRAMUSCULAR; INTRAVENOUS; SUBCUTANEOUS ONCE
Refills: 0 | Status: COMPLETED | OUTPATIENT
Start: 2021-12-11 | End: 2021-12-11

## 2021-12-11 RX ADMIN — SODIUM CHLORIDE 1000 MILLILITER(S): 9 INJECTION INTRAMUSCULAR; INTRAVENOUS; SUBCUTANEOUS at 08:07

## 2021-12-11 RX ADMIN — MORPHINE SULFATE 4 MILLIGRAM(S): 50 CAPSULE, EXTENDED RELEASE ORAL at 08:22

## 2021-12-11 NOTE — ED ADULT NURSE NOTE - DATE OF LAST VACCINATION
Chief complaint:   Chief Complaint   Patient presents with   • Back Pain     Carlotta is here today with complaint of low back pain with right leg pain.  She has had her MRI of the lumbar done here at [facility] in May.  Started physical therapy about 2 weeks, this has not been helpful yet, she actually states it has  made it worse.         Subjective     HPI: This is a 72-year-old female patient who was referred to us by Dr. Misty Pelayo for back pain and right lower extremity pain.  She is here to be evaluated today.  The patient says that she has been dealing with this pain since the end of February 2021.  There is no accident or injury associated with this.  She said initially the pain started when they was driving out to [place] and she did well on the drive out there but then on the way back home she started having some pain initially on the left leg but this did resolve and started coming over into her right lower extremity and has been present since that time.  She does have intermittent back pain but there is no specific modifying factors for her back pain.  Her biggest complaint is pain that radiates into her right lower extremity that goes all the way to her foot.  This pain is constant.  She will occasionally get some left lower extremity pain.  She says the pain is worse whenever she is sitting or laying down and better when she is standing or walking.  She has just recently gotten started in physical therapy at [facility] and has done 2 sessions without any significant improvement.  She did try 2 sessions of chiropractic care without any improvement.  She has been taking NSAIDs with minimal improvement.  She is not done any pain management injections.  Rates her pain on a scale 0-10 at an 8.  She says it does interfere with actives of daily living.  She is right-hand dominant.  She is retired.  She is .  Denies any tobacco, alcohol, or illicit drug use    Review of Systems   Constitutional: Positive  for activity change and fatigue.   HENT: Positive for ear pain, facial swelling, hearing loss and sneezing.    Gastrointestinal: Positive for constipation.   Musculoskeletal: Positive for arthralgias, back pain, gait problem and myalgias.   Neurological: Positive for weakness and numbness.   Psychiatric/Behavioral: Positive for sleep disturbance.   All other systems reviewed and are negative.       Past Medical History:   Diagnosis Date   • Acid reflux    • Arthritis    • Asthma    • Diabetes mellitus (CMS/HCC)     Type 2   • Fistula of mastoid, left    • GERD (gastroesophageal reflux disease)    • History of transfusion    • Hx of colonic polyps    • Hyperlipidemia    • Hypertension    • Iron deficiency anemia    • Mastoid pain, left    • Numbness     left side of face   • Other osteomyelitis, other site (CMS/HCC)    • PONV (postoperative nausea and vomiting)    • Sensorineural hearing loss    • Sleep apnea     does not use machine   • Trigeminal neuralgia    • Vitamin D deficiency      Past Surgical History:   Procedure Laterality Date   • APPENDECTOMY     • BLADDER REPAIR      had vaginal repair at the same time   • BREAST BIOPSY Bilateral     benign   • CAPSULE ENDOSCOPY N/A 4/20/2018    Procedure: CAPSULE ENDOSCOPY M2A;  Surgeon: Garrett Ozuna MD;  Location: [facility] ENDOSCOPY;  Service: Gastroenterology   • COLONOSCOPY N/A 3/1/2018    Procedure: COLONOSCOPY WITH ANESTHESIA;  Surgeon: Garrett Ozuna MD;  Location: [facility] ENDOSCOPY;  Service:    • COLONOSCOPY N/A 4/16/2021    Procedure: COLONOSCOPY WITH ANESTHESIA;  Surgeon: Garertt Ozuna MD;  Location: [facility] ENDOSCOPY;  Service: Gastroenterology;  Laterality: N/A;  pre: hx colon polyps  post: polyp  Misty Pelayo MD   • COLONOSCOPY W/ POLYPECTOMY  12/31/2014    Tubular adenomatous polyp at 80 cm, Hyperplastic polyp rectum repeat exam in 3 years   • ENDOSCOPY N/A 3/1/2018    Procedure: ESOPHAGOGASTRODUODENOSCOPY WITH ANESTHESIA;  Surgeon: Garrett Ozuna  "MD;  Location: [facility] ENDOSCOPY;  Service:    • FLAP HEAD/NECK Left 5/17/2018    Procedure: POSSIBLE STERNOCLEIDOMASTOID FLAP;  Surgeon: Kadeem Oconnor MD;  Location: [facility] OR;  Service: ENT   • HYSTERECTOMY     • MASTOIDECTOMY Left 5/17/2018    Procedure: Wound exploration with possible mastoidectomy; possible sternocleidomastoid flap.  Please schedule with Dr. Perez.;  Surgeon: Kadeem Oconnor MD;  Location: [facility] OR;  Service: ENT   • TRIGEMINAL NERVE DECOMPRESSION       Family History   Problem Relation Age of Onset   • Breast cancer Sister    • Breast cancer Mother    • Heart disease Father    • No Known Problems Brother    • No Known Problems Daughter    • No Known Problems Son    • No Known Problems Maternal Grandmother    • No Known Problems Paternal Grandmother    • No Known Problems Maternal Aunt    • No Known Problems Paternal Aunt    • Breast cancer Niece    • Colon cancer Neg Hx    • Colon polyps Neg Hx    • BRCA 1/2 Neg Hx    • Endometrial cancer Neg Hx    • Ovarian cancer Neg Hx      Social History     Tobacco Use   • Smoking status: Never Smoker   • Smokeless tobacco: Never Used   Vaping Use   • Vaping Use: Never used   Substance Use Topics   • Alcohol use: Yes     Comment: Rare   • Drug use: No     (Not in a hospital admission)    Allergies:  Zovirax [acyclovir], Meperidine, Propranolol, Sertraline hcl, Vesicare [solifenacin succinate], and Solifenacin    Objective      Vital Signs  /70   Ht 174 cm (68.5\")   Wt 74.8 kg (165 lb)   BMI 24.72 kg/m²     Physical Exam  Constitutional:       Appearance: Normal appearance. She is well-developed.   HENT:      Head: Normocephalic.   Eyes:      General: Lids are normal.      Conjunctiva/sclera: Conjunctivae normal.      Pupils: Pupils are equal, round, and reactive to light.   Cardiovascular:      Rate and Rhythm: Normal rate and regular rhythm.   Pulmonary:      Effort: Pulmonary effort is normal.      Breath sounds: Normal breath sounds. "   Musculoskeletal:         General: Normal range of motion.      Cervical back: Normal range of motion.   Skin:     General: Skin is warm.   Neurological:      Mental Status: She is alert and oriented to person, place, and time.      GCS: GCS eye subscore is 4. GCS verbal subscore is 5. GCS motor subscore is 6.      Cranial Nerves: No cranial nerve deficit.      Sensory: No sensory deficit.      Motor: Weakness present.      Gait: Gait abnormal.      Deep Tendon Reflexes: Reflexes are normal and symmetric. Reflexes normal.      Comments: Limp favoring the right lower extremity    Right EHL 3 out of 5  All other muscle groups of bilateral   Psychiatric:         Speech: Speech normal.         Behavior: Behavior normal.         Thought Content: Thought content normal.         Neurologic Exam     Mental Status   Oriented to person, place, and time.   Speech: speech is normal     Cranial Nerves     CN III, IV, VI   Pupils are equal, round, and reactive to light.      Results Review: MRI of the lumbar that was done here at [facility] on May 14, 2021 shows the patient does have lumbar disc degeneration with disc bulging causing bilateral foraminal narrowing.  At L4-5 lumbar stenosis present with bilateral foraminal narrowing with the right being worse than the left as well as a disc herniation on the right.  There is mild amount of degenerative scoliosis as well.  No fracture visualized.        Assessment/Plan: I am going to send the patient for stat x-rays of the lumbar spine to include standing flexion and extension.  I will discuss this patient with Dr. Perez for further recommendations.  She is can continue working with physical therapy at this time.  I will also prescribe her tramadol to see if this will help control her pain better at this point.  She was told to call us if any worsening pain issues.  I advised the patient to call and return sooner for new or worsening complaints of weakness,  paresthesias, gait disturbances, or any additional concerns.  Treatment options discussed in detail with Carlotta and the patient voiced understanding.  Ms. Dupont agrees with this plan of care.     Patient is a nonsmoker  The patient's Body mass index is 24.72 kg/m².. BMI is above normal parameters. Recommendations include: educational material and nutrition counseling  Advance Care Planning   ACP discussion was held with the patient during this visit. Patient does not have an advance directive, information provided.      Diagnoses and all orders for this visit:    1. Lumbar disc herniation (Primary)  -     XR Spine Lumbar Complete With Flex & Ext; Future  -     traMADol (ULTRAM) 50 MG tablet; Take 1 tablet by mouth Every 8 (Eight) Hours As Needed for Moderate Pain  or Severe Pain .  Dispense: 21 tablet; Refill: 0    2. Degeneration of lumbar or lumbosacral intervertebral disc  -     XR Spine Lumbar Complete With Flex & Ext; Future  -     traMADol (ULTRAM) 50 MG tablet; Take 1 tablet by mouth Every 8 (Eight) Hours As Needed for Moderate Pain  or Severe Pain .  Dispense: 21 tablet; Refill: 0    3. Spinal stenosis of lumbar region without neurogenic claudication  -     XR Spine Lumbar Complete With Flex & Ext; Future  -     traMADol (ULTRAM) 50 MG tablet; Take 1 tablet by mouth Every 8 (Eight) Hours As Needed for Moderate Pain  or Severe Pain .  Dispense: 21 tablet; Refill: 0    4. Lumbar radiculopathy  -     XR Spine Lumbar Complete With Flex & Ext; Future  -     traMADol (ULTRAM) 50 MG tablet; Take 1 tablet by mouth Every 8 (Eight) Hours As Needed for Moderate Pain  or Severe Pain .  Dispense: 21 tablet; Refill: 0    5. Body mass index (BMI) of 24.0 to 24.9 in adult    6. Non-smoker          I discussed the patients findings and my recommendations with patient    Ward Moffett, CHRISTIAN  05/24/21  10:06 CDT     14-May-2021

## 2021-12-11 NOTE — ED ADULT NURSE NOTE - NSICDXPASTMEDICALHX_GEN_ALL_CORE_FT
PAST MEDICAL HISTORY:  No pertinent past medical history     Spleen hematoma, initial encounter

## 2021-12-11 NOTE — ED ADULT TRIAGE NOTE - CHIEF COMPLAINT QUOTE
Patient complaining of lower abdominal pain that started 4 day ago but worsened over the past 24 hrs. Pain worse upon urinating

## 2021-12-11 NOTE — ED PROVIDER NOTE - OBJECTIVE STATEMENT
51 year old F with hx of cholecystectomy c/o 4 days of lower abd pain. Pain has been sharp/intermittent and gradually worsening. + assoc urgency frequency and urgency. Denies assoc fever/chills, nausea, vomiting, flank pain, diarrhea/alterations in bowel habits, hematuria, bloody stools, recent travel, sick contacts.

## 2021-12-11 NOTE — ED PROVIDER NOTE - PATIENT PORTAL LINK FT
You can access the FollowMyHealth Patient Portal offered by [facility] by registering at the following website: [email or web address]. By joining [facility]’s FollowMyHealth portal, you will also be able to view your health information using other applications (apps) compatible with our system.

## 2021-12-11 NOTE — ED PROVIDER NOTE - PHYSICAL EXAMINATION
CONSTITUTIONAL: Well-appearing; well-nourished; in no apparent distress.   EYES: PERRL; EOM intact.   ENT: normal nose; no rhinorrhea; normal pharynx with no tonsillar hypertrophy.   NECK: Supple; non-tender; no cervical lymphadenopathy.   CARDIOVASCULAR: Normal S1, S2; no murmurs, rubs, or gallops.   RESPIRATORY: Normal chest excursion with respiration; breath sounds clear and equal bilaterally; no wheezes, rhonchi, or rales.  GI/: Normal bowel sounds; non-distended; + lower abd ttp. No rebound or guarding. No cva ttp   MS: No evidence of trauma or deformity. Normal ROM in all four extremities; non-tender to palpation; distal pulses are normal.   SKIN: Normal for age and race; warm; dry; good turgor; no apparent lesions or exudate.   NEURO/PSYCH: A & O x 4; grossly unremarkable. mood and manner are appropriate.

## 2021-12-11 NOTE — ED PROVIDER NOTE - CARE PROVIDER_API CALL
Niurka Gage (MD)  Gastroenterology  [address] [place] [address]  Phone: [phone]  Fax: [phone]  Follow Up Time:

## 2021-12-11 NOTE — ED PROVIDER NOTE - CLINICAL SUMMARY MEDICAL DECISION MAKING FREE TEXT BOX
patient has improved at this time we have obtained labs as well as ct scan which reveals acute diverticulitis with no complications we have initiated po antibiotics I will discharge at this time with follow up to GI

## 2021-12-11 NOTE — ED PROVIDER NOTE - PROGRESS NOTE DETAILS
patient has improved at this time we initiated po antibiotics I will discharge at this time with follow to GI

## 2021-12-11 NOTE — ED PROVIDER NOTE - NS ED ROS FT
Constitutional: no fever, chills, no recent weight loss, change in appetite or malaise  Eyes: no redness/discharge/pain/vision changes  ENT: no rhinorrhea/ear pain/sore throat  Cardiac: No chest pain, SOB or edema.  Respiratory: No cough or respiratory distress  GI: + lower abd pain. No nausea, vomiting, diarrhea   : +frequency and urgency  MS: no pain to back or extremities, no loss of ROM, no weakness  Neuro: No headache or weakness. No LOC.  Skin: No skin rash.  Endocrine: No history of thyroid disease or diabetes.  Except as documented in the HPI, all other systems are negative.

## 2022-03-17 NOTE — ASU DISCHARGE PLAN (ADULT/PEDIATRIC) - FOLLOW UP APPOINTMENTS
Alverto Art M.D.     ASSISTANT: Antonino Cyr     [place]:  [facility]…

## 2022-04-25 RX ORDER — CLARITHROMYCIN 500 MG/1
500 TABLET, FILM COATED ORAL TWICE DAILY
Qty: 28 | Refills: 0 | Status: DISCONTINUED | COMMUNITY
Start: 2020-10-07 | End: 2022-04-25

## 2022-04-25 RX ORDER — PANTOPRAZOLE 40 MG/1
40 TABLET, DELAYED RELEASE ORAL TWICE DAILY
Qty: 60 | Refills: 6 | Status: DISCONTINUED | COMMUNITY
Start: 2020-10-07 | End: 2022-04-25

## 2022-04-25 RX ORDER — AMOXICILLIN 500 MG/1
500 CAPSULE ORAL TWICE DAILY
Qty: 56 | Refills: 0 | Status: DISCONTINUED | COMMUNITY
Start: 2020-10-07 | End: 2022-04-25

## 2022-04-25 RX ORDER — APIXABAN 5 MG/1
5 TABLET, FILM COATED ORAL
Refills: 0 | Status: DISCONTINUED | COMMUNITY
End: 2022-04-25

## 2022-05-03 ENCOUNTER — TRANSCRIPTION ENCOUNTER (OUTPATIENT)
Age: 52
End: 2022-05-03

## 2022-05-03 ENCOUNTER — APPOINTMENT (OUTPATIENT)
Dept: GASTROENTEROLOGY | Facility: CLINIC | Age: 52
End: 2022-05-03
Payer: COMMERCIAL

## 2022-05-03 DIAGNOSIS — K63.5 POLYP OF COLON: ICD-10-CM

## 2022-05-03 DIAGNOSIS — K57.32 DIVERTICULITIS OF LARGE INTESTINE W/OUT PERFORATION OR ABSCESS W/OUT BLEEDING: ICD-10-CM

## 2022-05-03 PROCEDURE — 99214 OFFICE O/P EST MOD 30 MIN: CPT | Mod: 95

## 2022-05-03 RX ORDER — SODIUM SULFATE, MAGNESIUM SULFATE, AND POTASSIUM CHLORIDE 17.75; 2.7; 2.25 G/1; G/1; G/1
1479-225-188 TABLET ORAL
Qty: 24 | Refills: 0 | Status: ACTIVE | COMMUNITY
Start: 2022-05-03 | End: 1900-01-01

## 2022-05-03 NOTE — PHYSICAL EXAM
[General Appearance - Alert] : alert [Hearing Threshold Finger Rub Not [place]] : hearing was normal [] : no respiratory distress [Skin Color & Pigmentation] : normal skin color and pigmentation [Oriented To Time, Place, And Person] : oriented to person, place, and time

## 2022-05-03 NOTE — HISTORY OF PRESENT ILLNESS
[Home] : at home, [unfilled] , at the time of the visit. [Medical Office: ([facility])___] : at the medical office located in  [Verbal consent obtained from patient] : the patient, [unfilled] [FreeTextEntry4] : Cee Cowan  [de-identified] : 50 year old female pt increased risk for CRC, active smoker, hx of  splenic infarct, was on eliquis, now taken off, hx of H-Pylori severe gastritis with IM of antrum\par Duodenal biopsy negative for CD\par Colonoscopy showed 7 polyps with TAs among others\par \par Recently, pt was in ED 4 months ago with diverticulitis, which currently resolved. Gained weight, no blood in stool.

## 2022-05-03 NOTE — ASSESSMENT
[FreeTextEntry1] : 50 year old female pt increased risk for CRC, active smoker, hx of  splenic infarct, was on eliquis, now taken off, hx of H-Pylori severe gastritis with IM of antrum\par Duodenal biopsy negative for CD\par Colonoscopy showed 7 polyps with TAs among others\par \par Recently, pt was in ED 4 months ago with diverticulitis, which currently resolved. Gained weight, no blood in stool.\par \par \par Colon polyps\par Increased risk for CRC\par Recent acute diverticulitis, uncomplicated \par Needs colonoscopy\par Risks and benefits discussed with patient.\par

## 2022-07-01 ENCOUNTER — TRANSCRIPTION ENCOUNTER (OUTPATIENT)
Age: 52
End: 2022-07-01

## 2022-07-01 ENCOUNTER — RESULT REVIEW (OUTPATIENT)
Age: 52
End: 2022-07-01

## 2022-07-01 ENCOUNTER — OUTPATIENT (OUTPATIENT)
Dept: OUTPATIENT SERVICES | Facility: HOSPITAL | Age: 52
LOS: 1 days | Discharge: HOME | End: 2022-07-01

## 2022-07-01 VITALS — WEIGHT: 264.55 LBS

## 2022-07-01 VITALS — HEART RATE: 74 BPM | SYSTOLIC BLOOD PRESSURE: 123 MMHG | DIASTOLIC BLOOD PRESSURE: 67 MMHG | RESPIRATION RATE: 17 BRPM

## 2022-07-01 DIAGNOSIS — Z90.49 ACQUIRED ABSENCE OF OTHER SPECIFIED PARTS OF DIGESTIVE TRACT: Chronic | ICD-10-CM

## 2022-07-01 DIAGNOSIS — Z98.890 OTHER SPECIFIED POSTPROCEDURAL STATES: Chronic | ICD-10-CM

## 2022-07-01 PROCEDURE — 45380 COLONOSCOPY AND BIOPSY: CPT

## 2022-07-01 PROCEDURE — 88305 TISSUE EXAM BY PATHOLOGIST: CPT | Mod: 26

## 2022-07-01 NOTE — ASU PATIENT PROFILE, ADULT - FALL HARM RISK - HARM RISK INTERVENTIONS

## 2022-07-01 NOTE — ASU PATIENT PROFILE, ADULT - NSICDXPASTMEDICALHX_GEN_ALL_CORE_FT
PAST MEDICAL HISTORY:  Colon polyps     Diverticulitis     No pertinent past medical history     Spleen hematoma, initial encounter

## 2022-07-01 NOTE — CHART NOTE - NSCHARTNOTEFT_GEN_A_CORE
PACU ANESTHESIA ADMISSION NOTE      Procedure:   Post op diagnosis:      ____  Intubated  TV:______       Rate: ______      FiO2: ______    _x___  Patent Airway    _x___  Full return of protective reflexes    _x___  Full recovery from anesthesia / back to baseline status    Vitals:    BP  157/86  P  59  R  14  Sat  96    Mental Status:  _x___ Awake   _____ Alert   _____ Drowsy   _____ Sedated    Nausea/Vomiting:  _x___  NO       ______Yes,   See Post - Op Orders         Pain Scale (0-10):  __0___    Treatment: _x___ None    ____ See Post - Op/PCA Orders    Post - Operative Fluids:   __x__ Oral   ____ See Post - Op Orders    Plan: Discharge:   _x___Home       _____Floor     _____Critical Care    _____  Other:_________________    Comments:  No anesthesia issues or complications noted.  Discharge when criteria met.

## 2022-07-01 NOTE — ASU DISCHARGE PLAN (ADULT/PEDIATRIC) - CARE PROVIDER_API CALL
Niurka Gage (MD)  Gastroenterology  [address] [place] [address]  Phone: [phone]  Fax: [phone]  Follow Up Time:
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
## 2022-07-06 LAB — SURGICAL PATHOLOGY STUDY: SIGNIFICANT CHANGE UP

## 2022-07-07 DIAGNOSIS — K64.4 RESIDUAL HEMORRHOIDAL SKIN TAGS: ICD-10-CM

## 2022-07-07 DIAGNOSIS — Z86.010 PERSONAL HISTORY OF COLONIC POLYPS: ICD-10-CM

## 2022-07-07 DIAGNOSIS — Z12.11 ENCOUNTER FOR SCREENING FOR MALIGNANT NEOPLASM OF COLON: ICD-10-CM

## 2022-07-07 DIAGNOSIS — K51.40 INFLAMMATORY POLYPS OF COLON WITHOUT COMPLICATIONS: ICD-10-CM

## 2022-07-07 DIAGNOSIS — K52.9 NONINFECTIVE GASTROENTERITIS AND COLITIS, UNSPECIFIED: ICD-10-CM

## 2022-07-07 DIAGNOSIS — Z90.49 ACQUIRED ABSENCE OF OTHER SPECIFIED PARTS OF DIGESTIVE TRACT: ICD-10-CM

## 2022-07-07 DIAGNOSIS — K57.30 DIVERTICULOSIS OF LARGE INTESTINE WITHOUT PERFORATION OR ABSCESS WITHOUT BLEEDING: ICD-10-CM

## 2024-01-04 NOTE — ED PROVIDER NOTE - ATTENDING CONTRIBUTION TO CARE
Additional Notes: Will call in Fluorouracil if needed.
Detail Level: Simple
Render Risk Assessment In Note?: no
I was present for and supervised the key and critical aspects of the procedures performed during the care of the patient. Patient presents for evaluation of abdominal pain it is b/l lower quad abdominal pain over the past 4 days with no diarrhea no vomiting no fevers or chills noted   on exam she is nc/at perrla eomi oropharynx clear cta b/l, rrr s1s2 noted abd- soft no tender to palpation in the b/l lower quadrants no guarding no rebound ext from with no edema noted   A/P- patient given iv fluids iv pain medication we obtained lab as well as ct scan with acute diverticulitis   patient has improved at this time

## 2024-02-15 NOTE — ASU PREOP CHECKLIST - TO WHOM
Counseling and Referral of Other Preventative  (Italic type indicates deductible and co-insurance are waived)    Patient Name: Claudia Sierra  Today's Date: 2/15/2024    Health Maintenance       Date Due Completion Date    TETANUS VACCINE Never done ---    RSV Vaccine (Age 60+ and Pregnant patients) (1 - 1-dose 60+ series) Never done ---    Pneumococcal Vaccines (Age 65+) (2 of 2 - PPSV23 or PCV20) 12/02/2019 10/7/2019    COVID-19 Vaccine (6 - 2023-24 season) 09/01/2023 10/27/2022    Lipid Panel 10/25/2023 10/25/2018    DEXA Scan 03/30/2024 3/30/2021        Orders Placed This Encounter   Procedures    (In Office Administered) Pneumococcal Conjugate Vaccine (20 Valent) (IM) (Preferred)     The following information is provided to all patients.  This information is to help you find resources for any of the problems found today that may be affecting your health:                  Living healthy guide: www.[facility].[email or web address].[facility]      Understanding Diabetes: www.diabetes.org      Eating healthy: www.cdc.gov/healthyweight      CDC home safety checklist: www.cdc.gov/steadi/patient.html      Agency on Aging: www.goea.[place].gov      Alcoholics anonymous (AA): www.aa.org      Physical Activity: www.[place].nih.gov/kp3qsuc      Tobacco use: www.quitwithusla.org         
Luana

## 2024-03-25 NOTE — REVIEW OF SYSTEMS
RN at bedside discussing pain management options with pt. Pt asking questions about getting an epidural. Pt states she is very anxious about getting an epidural. Discussed with pt about receiving a dose of IV pain meds to help decrease her pain and help her relax then proceed with an epidural. Pt agreeable with POC.    [Negative] : Endocrine

## 2024-09-16 NOTE — CONSULT LETTER
Notified kathya carney NP of glucose level. Orders for amp of D50 and recheck one hour after.    [DrSundar  ___] : Dr. NUR

## 2024-11-07 ENCOUNTER — APPOINTMENT (OUTPATIENT)
Dept: ORTHOPEDIC SURGERY | Facility: CLINIC | Age: 54
End: 2024-11-07
Payer: COMMERCIAL

## 2024-11-07 VITALS — WEIGHT: 250 LBS | BODY MASS INDEX: 37.03 KG/M2 | HEIGHT: 69 IN

## 2024-11-07 DIAGNOSIS — M25.562 PAIN IN LEFT KNEE: ICD-10-CM

## 2024-11-07 DIAGNOSIS — M25.462 EFFUSION, LEFT KNEE: ICD-10-CM

## 2024-11-07 PROBLEM — K63.5 POLYP OF COLON: Chronic | Status: ACTIVE | Noted: 2022-07-01

## 2024-11-07 PROBLEM — K57.92 DIVERTICULITIS OF INTESTINE, PART UNSPECIFIED, WITHOUT PERFORATION OR ABSCESS WITHOUT BLEEDING: Chronic | Status: ACTIVE | Noted: 2022-07-01

## 2024-11-07 PROCEDURE — 73562 X-RAY EXAM OF KNEE 3: CPT | Mod: LT

## 2024-11-07 PROCEDURE — 20610 DRAIN/INJ JOINT/BURSA W/O US: CPT | Mod: LT

## 2024-11-07 PROCEDURE — 99203 OFFICE O/P NEW LOW 30 MIN: CPT | Mod: 25

## 2024-11-07 RX ORDER — DICLOFENAC SODIUM 75 MG/1
75 TABLET, DELAYED RELEASE ORAL
Qty: 60 | Refills: 1 | Status: ACTIVE | COMMUNITY
Start: 2024-11-07 | End: 1900-01-01

## 2024-11-07 RX ORDER — HYDROCHLOROTHIAZIDE 12.5 MG/1
TABLET ORAL
Refills: 0 | Status: ACTIVE | COMMUNITY

## 2024-11-13 ENCOUNTER — NON-APPOINTMENT (OUTPATIENT)
Age: 54
End: 2024-11-13

## 2024-11-21 ENCOUNTER — APPOINTMENT (OUTPATIENT)
Dept: ORTHOPEDIC SURGERY | Facility: CLINIC | Age: 54
End: 2024-11-21
Payer: COMMERCIAL

## 2024-11-21 ENCOUNTER — EMERGENCY (EMERGENCY)
Facility: HOSPITAL | Age: 54
LOS: 0 days | Discharge: HOME | End: 2024-11-21
Attending: EMERGENCY MEDICINE
Payer: COMMERCIAL

## 2024-11-21 ENCOUNTER — LABORATORY RESULT (OUTPATIENT)
Age: 54
End: 2024-11-21

## 2024-11-21 VITALS
TEMPERATURE: 98 F | SYSTOLIC BLOOD PRESSURE: 180 MMHG | HEART RATE: 115 BPM | OXYGEN SATURATION: 98 % | RESPIRATION RATE: 20 BRPM | WEIGHT: 250 LBS | DIASTOLIC BLOOD PRESSURE: 99 MMHG

## 2024-11-21 DIAGNOSIS — M25.562 PAIN IN LEFT KNEE: ICD-10-CM

## 2024-11-21 DIAGNOSIS — Z98.890 OTHER SPECIFIED POSTPROCEDURAL STATES: Chronic | ICD-10-CM

## 2024-11-21 DIAGNOSIS — M25.462 EFFUSION, LEFT KNEE: ICD-10-CM

## 2024-11-21 PROCEDURE — 20610 DRAIN/INJ JOINT/BURSA W/O US: CPT | Mod: LT

## 2024-11-21 PROCEDURE — 99284 EMERGENCY DEPT VISIT MOD MDM: CPT

## 2024-11-21 PROCEDURE — 99283 EMERGENCY DEPT VISIT LOW MDM: CPT | Mod: 25

## 2024-11-21 PROCEDURE — 99213 OFFICE O/P EST LOW 20 MIN: CPT | Mod: 25

## 2024-11-21 RX ORDER — OXYCODONE AND ACETAMINOPHEN 7.5; 325 MG/1; MG/1
1 TABLET ORAL ONCE
Refills: 0 | Status: DISCONTINUED | OUTPATIENT
Start: 2024-11-21 | End: 2024-11-21

## 2024-11-21 RX ORDER — DEXAMETHASONE 1.5 MG 1.5 MG/1
10 TABLET ORAL ONCE
Refills: 0 | Status: COMPLETED | OUTPATIENT
Start: 2024-11-21 | End: 2024-11-21

## 2024-11-21 RX ADMIN — OXYCODONE AND ACETAMINOPHEN 1 TABLET(S): 7.5; 325 TABLET ORAL at 11:59

## 2024-11-21 RX ADMIN — DEXAMETHASONE 1.5 MG 10 MILLIGRAM(S): 1.5 TABLET ORAL at 11:59

## 2024-11-21 NOTE — ED ADULT NURSE NOTE - SUICIDE SCREENING QUESTION 3
No Implemented All Universal Safety Interventions:  [place] to call system. Call bell, personal items and telephone within reach. Instruct patient to call for assistance. Room bathroom lighting operational. Non-slip footwear when patient is off stretcher. Physically safe environment: no spills, clutter or unnecessary equipment. Stretcher in lowest position, wheels locked, appropriate side rails in place.

## 2024-11-21 NOTE — ED PROVIDER NOTE - NSFOLLOWUPINSTRUCTIONS_ED_ALL_ED_FT
Knee Pain    WHAT YOU NEED TO KNOW:    Knee pain may start suddenly, or it may be a long-term problem. You may have pain on the side, front, or back of your knee. You may have knee stiffness and swelling. You may hear popping sounds or feel like your knee is giving way or locking up as you walk. You may feel pain when you sit, stand, walk, or climb up and down stairs. Knee pain can be caused by conditions such as obesity, inflammation, or strains or tears in ligaments or tendons.     DISCHARGE INSTRUCTIONS:    Return to the emergency department if:     Your pain is worse, even after treatment.       You cannot bend or straighten your leg completely.       The swelling around your knee does not go down even with treatment.      Your knee is painful and hot to the touch.     Contact your healthcare provider if:     You have questions or concerns about your condition or care.         Medicines: You may need any of the following:     NSAIDs help decrease swelling and pain or fever. This medicine is available with or without a doctor's order. NSAIDs can cause stomach bleeding or kidney problems in certain people. If you take blood thinner medicine, always ask your healthcare provider if NSAIDs are safe for you. Always read the medicine label and follow directions.      Acetaminophen decreases pain and fever. It is available without a doctor's order. Ask how much to take and how often to take it. Follow directions. Read the labels of all other medicines you are using to see if they also contain acetaminophen, or ask your doctor or pharmacist. Acetaminophen can cause liver damage if not taken correctly. Do not use more than 4 grams (4,000 milligrams) total of acetaminophen in one day.       Prescription pain medicine may be given. Ask your healthcare provider how to take this medicine safely. Some prescription pain medicines contain acetaminophen. Do not take other medicines that contain acetaminophen without talking to your healthcare provider. Too much acetaminophen may cause liver damage. Prescription pain medicine may cause constipation. Ask your healthcare provider how to prevent or treat constipation.       Take your medicine as directed. Contact your healthcare provider if you think your medicine is not helping or if you have side effects. Tell him or her if you are allergic to any medicine. Keep a list of the medicines, vitamins, and herbs you take. Include the amounts, and when and why you take them. Bring the list or the pill bottles to follow-up visits. Carry your medicine list with you in case of an emergency.    What you can do to manage your symptoms:     Rest your knee so it can heal. Limit activities that increase your pain. Do low-impact exercises, such as walking or swimming.       Apply ice to help reduce swelling and pain. Use an ice pack, or put crushed ice in a plastic bag. Cover it with a towel before you apply it to your knee. Apply ice for 15 to 20 minutes every hour, or as directed.      Apply compression to help reduce swelling. Use a brace or bandage only as directed.      Elevate your knee to help decrease pain and swelling. Elevate your knee while you are sitting or lying down. Prop your leg on pillows to keep your knee above the level of your heart.      Prevent your knee from moving as directed. Your healthcare provider may put on a cast or splint. You may need to wear a leg brace to stabilize your knee. A leg brace can be adjusted to increase your range of motion as your knee heals.Hinged Knee Braces          What you can do to prevent knee pain:     Maintain a healthy weight. Extra weight increases your risk for knee pain. Ask your healthcare provider how much you should weigh. He or she can help you create a safe weight loss plan if you need to lose weight.      Exercise or train properly. Use the correct equipment for sports. Wear shoes that provide good support. Check your posture often as you exercise, play sports, or train for an event. This can help prevent stress and strain on your knees. Rest between sessions so you do not overwork your knees.    Follow up with your healthcare provider within 24 hours or as directed: You may need follow-up treatments, such as steroid injections to decrease pain. Write down your questions so you remember to ask them during your visits.        © Copyright [employer] 2019 All illustrations and images included in CareNotes are the copyrighted property of [employer] or [employer].

## 2024-11-21 NOTE — ED PROVIDER NOTE - PATIENT PORTAL LINK FT
You can access the FollowMyHealth Patient Portal offered by [facility] by registering at the following website: [email or web address]. By joining [employer]’s FollowMyHealth portal, you will also be able to view your health information using other applications (apps) compatible with our system.

## 2024-11-21 NOTE — ED ADULT NURSE NOTE - NSFALLUNIVINTERV_ED_ALL_ED
Right hallux wound
Detail Level: Detailed
Wound Evaluated By: Dr. Nation
Bed/Stretcher in lowest position, wheels locked, appropriate side rails in place/Call bell, personal items and telephone in reach/Instruct patient to call for assistance before getting out of bed/chair/stretcher/Non-slip footwear applied when patient is off stretcher/[place] to call system/Physically safe environment - no spills, clutter or unnecessary equipment/Purposeful proactive rounding/Room/bathroom lighting operational, light cord in reach

## 2024-11-21 NOTE — ED ADULT TRIAGE NOTE - CHIEF COMPLAINT QUOTE
pt complaining of left knee pain, being treated as outpatient for arthritis but wants a second opinion

## 2024-11-21 NOTE — ED PROVIDER NOTE - OBJECTIVE STATEMENT
54-year-old female, no past medical history, presents emergency department for left knee pain patient states since June and has been on and off mild aching nonradiating pain and is following with orthopedics.  Recently had MRI that showed soft tissue damage, plugged in with physical therapy, noted to have left knee drained several weeks ago which was negative for infection or gout.

## 2024-11-21 NOTE — ED PROVIDER NOTE - PHYSICAL EXAMINATION
Physical Exam    Vital Signs: I have reviewed the initial vital signs.  Constitutional: appears stated age, no acute distress  Eyes: Conjunctiva pink, Sclera clear  Musculoskeletal: mild left knee ttp; rom intact.   Integumentary: warm, dry, no rash  Neurologic: awake, alert, nvi

## 2024-11-21 NOTE — ED PROVIDER NOTE - ATTENDING APP SHARED VISIT CONTRIBUTION OF CARE
I have personally performed a history and physical exam on this patient and personally directed the management of the patient. Patient is a 54-year-old female presents for evaluation of left-sided-sided knee pain moderate throbbing in nature onset over the past several weeks patient visited orthopedic as an outpatient 3 weeks prior on November 9 status post arthrocentesis which was negative per the patient in addition patient had an MRI which shows no acute abnormalities able to read the report on patient's phone denies any fevers chills presents here for worse pain and slightly accumulation of "fluid" denies any new trauma no falls vomiting or rashes    On physical exam patient is normocephalic atraumatic pupils equal round reactive light accommodation extraocular muscles intact oropharynx clear chest clear to station bilaterally abdomen soft nontender nondistended extremities reveals left-sided mild effusion no redness no warmth patient has adequate range of motion she is able to bear weight this is not consistent with infection not consistent with septic arthritis no evidence of neurovascular compromise given this patient is following up as an with orthopedics in addition had MRI given p.o. pain medicine advised indications to return patient is aware advised to follow-up with Ortho in next 24 to 48 hours

## 2024-11-21 NOTE — ED PROVIDER NOTE - CLINICAL SUMMARY MEDICAL DECISION MAKING FREE TEXT BOX
Patient is a 54-year-old female presents for evaluation of left-sided-sided knee pain moderate throbbing in nature onset over the past several weeks patient visited orthopedic as an outpatient 3 weeks prior on November 9 status post arthrocentesis which was negative per the patient in addition patient had an MRI which shows no acute abnormalities able to read the report on patient's phone denies any fevers chills presents here for worse pain and slightly accumulation of "fluid" denies any new trauma no falls vomiting or rashes    On physical exam patient is normocephalic atraumatic pupils equal round reactive light accommodation extraocular muscles intact oropharynx clear chest clear to station bilaterally abdomen soft nontender nondistended extremities reveals left-sided mild effusion no redness no warmth patient has adequate range of motion she is able to bear weight this is not consistent with infection not consistent with septic arthritis no evidence of neurovascular compromise given this patient is following up as an with orthopedics in addition had MRI given p.o. pain medicine advised indications to return patient is aware advised to follow-up with Ortho in next 24 to 48 hours

## 2024-11-25 LAB
B PERT IGG+IGM PNL SER: CLEAR
COLOR FLD: YELLOW
FLUID INTAKE SUBSTANCE CLASS: NORMAL
LYMPHOCYTES # FLD MANUAL: 3
MONOS+MACROS NFR FLD MANUAL: 79 %
NEUTS SEG # FLD MANUAL: 18 %
RBC # FLD MANUAL: 0 /UL
TOTAL CELLS COUNTED FLD: 395 /UL
TUBE TYPE: NORMAL
URATE SPEC-SCNC: 6.7 MG/DL

## 2024-12-05 ENCOUNTER — APPOINTMENT (OUTPATIENT)
Dept: ORTHOPEDIC SURGERY | Facility: CLINIC | Age: 54
End: 2024-12-05
Payer: COMMERCIAL

## 2024-12-05 DIAGNOSIS — M17.12 UNILATERAL PRIMARY OSTEOARTHRITIS, LEFT KNEE: ICD-10-CM

## 2024-12-05 PROCEDURE — 99203 OFFICE O/P NEW LOW 30 MIN: CPT

## 2024-12-06 ENCOUNTER — APPOINTMENT (OUTPATIENT)
Dept: ORTHOPEDIC SURGERY | Facility: CLINIC | Age: 54
End: 2024-12-06

## 2024-12-06 PROBLEM — M17.12 ARTHRITIS OF KNEE, LEFT: Status: ACTIVE | Noted: 2024-12-06

## 2025-01-06 ENCOUNTER — RX RENEWAL (OUTPATIENT)
Age: 55
End: 2025-01-06

## 2025-01-21 ENCOUNTER — APPOINTMENT (OUTPATIENT)
Dept: ORTHOPEDIC SURGERY | Facility: CLINIC | Age: 55
End: 2025-01-21
Payer: COMMERCIAL

## 2025-01-21 DIAGNOSIS — M17.12 UNILATERAL PRIMARY OSTEOARTHRITIS, LEFT KNEE: ICD-10-CM

## 2025-01-21 DIAGNOSIS — M25.562 PAIN IN LEFT KNEE: ICD-10-CM

## 2025-01-21 DIAGNOSIS — M25.462 EFFUSION, LEFT KNEE: ICD-10-CM

## 2025-01-21 PROCEDURE — 99213 OFFICE O/P EST LOW 20 MIN: CPT

## 2025-07-23 ENCOUNTER — EMERGENCY (EMERGENCY)
Facility: HOSPITAL | Age: 55
LOS: 0 days | Discharge: HOME | End: 2025-07-23
Attending: STUDENT IN AN ORGANIZED HEALTH CARE EDUCATION/TRAINING PROGRAM
Payer: COMMERCIAL

## 2025-07-23 VITALS — DIASTOLIC BLOOD PRESSURE: 91 MMHG | SYSTOLIC BLOOD PRESSURE: 158 MMHG | HEART RATE: 95 BPM

## 2025-07-23 VITALS
OXYGEN SATURATION: 98 % | HEART RATE: 94 BPM | SYSTOLIC BLOOD PRESSURE: 170 MMHG | RESPIRATION RATE: 18 BRPM | DIASTOLIC BLOOD PRESSURE: 112 MMHG

## 2025-07-23 DIAGNOSIS — I10 ESSENTIAL (PRIMARY) HYPERTENSION: ICD-10-CM

## 2025-07-23 DIAGNOSIS — R51.9 HEADACHE, UNSPECIFIED: ICD-10-CM

## 2025-07-23 DIAGNOSIS — Z98.890 OTHER SPECIFIED POSTPROCEDURAL STATES: Chronic | ICD-10-CM

## 2025-07-23 DIAGNOSIS — Z90.49 ACQUIRED ABSENCE OF OTHER SPECIFIED PARTS OF DIGESTIVE TRACT: Chronic | ICD-10-CM

## 2025-07-23 LAB
ALBUMIN SERPL ELPH-MCNC: 4 G/DL — SIGNIFICANT CHANGE UP (ref 3.5–5.2)
ALP SERPL-CCNC: 137 U/L — HIGH (ref 30–115)
ALT FLD-CCNC: 21 U/L — SIGNIFICANT CHANGE UP (ref 0–41)
ANION GAP SERPL CALC-SCNC: 14 MMOL/L — SIGNIFICANT CHANGE UP (ref 7–14)
AST SERPL-CCNC: 28 U/L — SIGNIFICANT CHANGE UP (ref 0–41)
BILIRUB SERPL-MCNC: 0.2 MG/DL — SIGNIFICANT CHANGE UP (ref 0.2–1.2)
BUN SERPL-MCNC: 11 MG/DL — SIGNIFICANT CHANGE UP (ref 10–20)
CALCIUM SERPL-MCNC: 9 MG/DL — SIGNIFICANT CHANGE UP (ref 8.4–10.5)
CHLORIDE SERPL-SCNC: 101 MMOL/L — SIGNIFICANT CHANGE UP (ref 98–110)
CO2 SERPL-SCNC: 24 MMOL/L — SIGNIFICANT CHANGE UP (ref 17–32)
CREAT SERPL-MCNC: 0.9 MG/DL — SIGNIFICANT CHANGE UP (ref 0.7–1.5)
EGFR: 76 ML/MIN/1.73M2 — SIGNIFICANT CHANGE UP
EGFR: 76 ML/MIN/1.73M2 — SIGNIFICANT CHANGE UP
GLUCOSE SERPL-MCNC: 106 MG/DL — HIGH (ref 70–99)
HCT VFR BLD CALC: 43.3 % — SIGNIFICANT CHANGE UP (ref 34.5–45)
HGB BLD-MCNC: 14.8 G/DL — SIGNIFICANT CHANGE UP (ref 11.5–15.5)
MCHC RBC-ENTMCNC: 31.8 PG — SIGNIFICANT CHANGE UP (ref 27–34)
MCHC RBC-ENTMCNC: 34.2 G/DL — SIGNIFICANT CHANGE UP (ref 32–36)
MCV RBC AUTO: 93.1 FL — SIGNIFICANT CHANGE UP (ref 80–100)
NRBC # BLD AUTO: 0 K/UL — SIGNIFICANT CHANGE UP (ref 0–0)
NRBC # FLD: 0 K/UL — SIGNIFICANT CHANGE UP (ref 0–0)
NRBC BLD AUTO-RTO: 0 /100 WBCS — SIGNIFICANT CHANGE UP (ref 0–0)
PLATELET # BLD AUTO: 250 K/UL — SIGNIFICANT CHANGE UP (ref 150–400)
PMV BLD: 10.3 FL — SIGNIFICANT CHANGE UP (ref 7–13)
POTASSIUM SERPL-MCNC: 4.2 MMOL/L — SIGNIFICANT CHANGE UP (ref 3.5–5)
POTASSIUM SERPL-SCNC: 4.2 MMOL/L — SIGNIFICANT CHANGE UP (ref 3.5–5)
PROT SERPL-MCNC: 6.8 G/DL — SIGNIFICANT CHANGE UP (ref 6–8)
RBC # BLD: 4.65 M/UL — SIGNIFICANT CHANGE UP (ref 3.8–5.2)
RBC # FLD: 12.7 % — SIGNIFICANT CHANGE UP (ref 10.3–14.5)
SODIUM SERPL-SCNC: 139 MMOL/L — SIGNIFICANT CHANGE UP (ref 135–146)
WBC # BLD: 10.44 K/UL — SIGNIFICANT CHANGE UP (ref 3.8–10.5)
WBC # FLD AUTO: 10.44 K/UL — SIGNIFICANT CHANGE UP (ref 3.8–10.5)

## 2025-07-23 PROCEDURE — 70450 CT HEAD/BRAIN W/O DYE: CPT

## 2025-07-23 PROCEDURE — 96375 TX/PRO/DX INJ NEW DRUG ADDON: CPT

## 2025-07-23 PROCEDURE — 70450 CT HEAD/BRAIN W/O DYE: CPT | Mod: 26

## 2025-07-23 PROCEDURE — 93010 ELECTROCARDIOGRAM REPORT: CPT

## 2025-07-23 PROCEDURE — 85027 COMPLETE CBC AUTOMATED: CPT

## 2025-07-23 PROCEDURE — 93005 ELECTROCARDIOGRAM TRACING: CPT

## 2025-07-23 PROCEDURE — 36415 COLL VENOUS BLD VENIPUNCTURE: CPT

## 2025-07-23 PROCEDURE — 99285 EMERGENCY DEPT VISIT HI MDM: CPT | Mod: 25

## 2025-07-23 PROCEDURE — 96374 THER/PROPH/DIAG INJ IV PUSH: CPT

## 2025-07-23 PROCEDURE — 99285 EMERGENCY DEPT VISIT HI MDM: CPT

## 2025-07-23 PROCEDURE — 80053 COMPREHEN METABOLIC PANEL: CPT

## 2025-07-23 RX ORDER — METOCLOPRAMIDE HCL 10 MG
10 TABLET ORAL ONCE
Refills: 0 | Status: COMPLETED | OUTPATIENT
Start: 2025-07-23 | End: 2025-07-23

## 2025-07-23 RX ORDER — MAGNESIUM SULFATE 500 MG/ML
2 SYRINGE (ML) INJECTION ONCE
Refills: 0 | Status: COMPLETED | OUTPATIENT
Start: 2025-07-23 | End: 2025-07-23

## 2025-07-23 RX ORDER — ACETAMINOPHEN 500 MG/5ML
650 LIQUID (ML) ORAL ONCE
Refills: 0 | Status: COMPLETED | OUTPATIENT
Start: 2025-07-23 | End: 2025-07-23

## 2025-07-23 RX ADMIN — Medication 650 MILLIGRAM(S): at 21:15

## 2025-07-23 RX ADMIN — Medication 25 GRAM(S): at 21:15

## 2025-07-23 RX ADMIN — Medication 1000 MILLILITER(S): at 21:16

## 2025-07-23 RX ADMIN — Medication 10 MILLIGRAM(S): at 21:16

## 2025-07-23 NOTE — ED PROVIDER NOTE - ATTENDING APP SHARED VISIT CONTRIBUTION OF CARE
55 yr old f w/ a pmh significant for htn who presents with headache and elevated bp. Pt states that today she took her bp as she was having a headaches. Pt denies any nausea, vomiting, fevers, chills, chest pain, sob or any other medical complaints.     VITAL SIGNS: I have reviewed nursing notes and confirm.  CONSTITUTIONAL: non-toxic, well appearing  SKIN: no rash, no petechiae.  EYES: EOMI, pink conjunctiva, anicteric  ENT: tongue midline, no exudates, MMM  NECK: Supple; no meningismus, no JVD  CARD: RRR, no murmurs, equal radial pulses bilaterally 2+  RESP: CTAB, no respiratory distress  ABD: Soft, non-tender, non-distended, no peritoneal signs, no HSM, no CVA tenderness  EXT: Normal ROM x4. No edema. No calves tenderness  NEURO: Alert, oriented x3. CN2-12 intact, equal strength bilaterally, nl gait.  PSYCH: Cooperative, appropriate.      55 yr old f that presents with headache and elevated bp. neurovascularly intact. labs, imaging, pain management. reassess. dispo pending.

## 2025-07-23 NOTE — ED PROVIDER NOTE - CLINICAL SUMMARY MEDICAL DECISION MAKING FREE TEXT BOX
55 yr old f that presents with headache and elevated bp. neurovascularly intact. labs, imaging, pain management. pt bp improved, symptoms have all resolved. Labs  were ordered and reviewed.  Imaging was ordered and reviewed by me.  Appropriate medications for patient's presenting complaints were ordered and effects were reassessed.  Patient's records (prior hospital, ED visit, and/or nursing home notes if available) were reviewed.  Additional history was obtained from EMS, family, and/or PCP (where available).  Escalation to admission/observation was considered.  However patient feels much better and is comfortable with discharge.  Appropriate follow-up was arranged.    I have discussed the discharge plan with the patient. The patient agrees with the plan, as discussed.  The patient understands Emergency Department diagnosis is a preliminary diagnosis often based on limited information and that the patient must adhere to the follow-up plan as discussed.  The patient understands that if the symptoms worsen or if prescribed medications do not have the desired/planned effect that the patient may return to the Emergency Department at any time for further evaluation and treatment.

## 2025-07-23 NOTE — ED ADULT NURSE NOTE - NSSUHOSCREENINGYN_ED_ALL_ED
Remote interrogation of implanted cardiac event monitor shows normal function. Patient has a history of CVA, pAF, PVCs, AV, and first degree HB. Takes Cardizem, Eliquis, lopressor, and Plavix. Patient's last device interrogation was on 12/15. Since last interrogation, no arrhythmias recorded. OSWALDO CHOI to review. See P[employer] report under the Cardiology tab. Follow up 1 month via [employer]. Yes - the patient is able to be screened

## 2025-07-23 NOTE — ED PROVIDER NOTE - OBJECTIVE STATEMENT
55 year old female with past medical history significant for htn who presents with headache and elevated bp. Pt states that today she took her bp as she was having a headaches. Pt denies any nausea, vomiting, fevers, chills, chest pain, sob or any other medical complaints.

## 2025-07-23 NOTE — ED PROVIDER NOTE - NSFOLLOWUPINSTRUCTIONS_ED_ALL_ED_FT
PLEASE FOLLOW UP WITH YOUR PRIMARY CARE DOCTOR 24 TO 48 HOURS AFTER THIS VISIT.     Headache    A headache is pain or discomfort felt around the head or neck area. The specific cause of a headache may not be found as there are many types including tension headaches, migraine headaches, and cluster headaches. Watch your condition for any changes. Things you can do to manage your pain include taking over the counter and prescription medications as instructed by your health care provider, lying down in a dark quiet room, limiting stress, getting regular sleep, and refraining from alcohol and tobacco products.    SEEK IMMEDIATE MEDICAL CARE IF YOU HAVE ANY OF THE FOLLOWING SYMPTOMS: fever, vomiting, stiff neck, loss of vision, problems with speech, muscle weakness, loss of balance, trouble walking, passing out, or confusion.

## 2025-07-23 NOTE — ED PROVIDER NOTE - PATIENT PORTAL LINK FT
You can access the FollowMyHealth Patient Portal offered by [facility] by registering at the following website: [email or web address]. By joining [employer]’s FollowMyHealth portal, you will also be able to view your health information using other applications (apps) compatible with our system.

## 2025-07-23 NOTE — ED PROVIDER NOTE - PROGRESS NOTE DETAILS
ER: pt informed of all findings. pt aaox3, neurovascularly intact. and reports improvement of symptoms. will dc pt with pcp follow up and strict return precautions.

## 2025-09-03 ENCOUNTER — EMERGENCY (EMERGENCY)
Facility: HOSPITAL | Age: 55
LOS: 0 days | Discharge: HOME | End: 2025-09-03
Attending: EMERGENCY MEDICINE
Payer: COMMERCIAL

## 2025-09-03 VITALS
HEART RATE: 104 BPM | DIASTOLIC BLOOD PRESSURE: 103 MMHG | SYSTOLIC BLOOD PRESSURE: 174 MMHG | OXYGEN SATURATION: 99 % | HEIGHT: 69 IN | WEIGHT: 240.08 LBS | RESPIRATION RATE: 20 BRPM | TEMPERATURE: 98 F

## 2025-09-03 DIAGNOSIS — Z90.49 ACQUIRED ABSENCE OF OTHER SPECIFIED PARTS OF DIGESTIVE TRACT: Chronic | ICD-10-CM

## 2025-09-03 DIAGNOSIS — Z98.890 OTHER SPECIFIED POSTPROCEDURAL STATES: Chronic | ICD-10-CM

## 2025-09-03 PROCEDURE — 99282 EMERGENCY DEPT VISIT SF MDM: CPT

## 2025-09-04 DIAGNOSIS — Z04.1 ENCOUNTER FOR EXAMINATION AND OBSERVATION FOLLOWING TRANSPORT ACCIDENT: ICD-10-CM
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
## 2025-09-04 DIAGNOSIS — R20.2 PARESTHESIA OF SKIN: ICD-10-CM
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
## 2025-09-17 ENCOUNTER — EMERGENCY (EMERGENCY)
Facility: HOSPITAL | Age: 55
LOS: 0 days | Discharge: HOME | End: 2025-09-17
Attending: EMERGENCY MEDICINE
Payer: COMMERCIAL

## 2025-09-17 VITALS
HEIGHT: 69 IN | WEIGHT: 240.08 LBS | HEART RATE: 81 BPM | OXYGEN SATURATION: 98 % | RESPIRATION RATE: 17 BRPM | SYSTOLIC BLOOD PRESSURE: 170 MMHG | TEMPERATURE: 98 F | DIASTOLIC BLOOD PRESSURE: 97 MMHG

## 2025-09-17 DIAGNOSIS — Z90.49 ACQUIRED ABSENCE OF OTHER SPECIFIED PARTS OF DIGESTIVE TRACT: Chronic | ICD-10-CM

## 2025-09-17 DIAGNOSIS — Z98.890 OTHER SPECIFIED POSTPROCEDURAL STATES: Chronic | ICD-10-CM

## 2025-09-17 LAB
ALBUMIN SERPL ELPH-MCNC: 4.5 G/DL — SIGNIFICANT CHANGE UP (ref 3.5–5.2)
ALP SERPL-CCNC: 173 U/L — HIGH (ref 30–115)
ALT FLD-CCNC: 35 U/L — SIGNIFICANT CHANGE UP (ref 0–41)
ANION GAP SERPL CALC-SCNC: 12 MMOL/L — SIGNIFICANT CHANGE UP (ref 7–14)
APPEARANCE UR: CLEAR — SIGNIFICANT CHANGE UP
AST SERPL-CCNC: 28 U/L — SIGNIFICANT CHANGE UP (ref 0–41)
BASOPHILS # BLD AUTO: 0.1 K/UL — SIGNIFICANT CHANGE UP (ref 0–0.2)
BASOPHILS NFR BLD AUTO: 1.2 % — HIGH (ref 0–1)
BILIRUB DIRECT SERPL-MCNC: <0.2 MG/DL — SIGNIFICANT CHANGE UP (ref 0–0.3)
BILIRUB INDIRECT FLD-MCNC: >0.2 MG/DL — SIGNIFICANT CHANGE UP (ref 0.2–1.2)
BILIRUB SERPL-MCNC: 0.4 MG/DL — SIGNIFICANT CHANGE UP (ref 0.2–1.2)
BILIRUB UR-MCNC: NEGATIVE — SIGNIFICANT CHANGE UP
BUN SERPL-MCNC: 12 MG/DL — SIGNIFICANT CHANGE UP (ref 10–20)
CALCIUM SERPL-MCNC: 10.2 MG/DL — SIGNIFICANT CHANGE UP (ref 8.4–10.5)
CHLORIDE SERPL-SCNC: 101 MMOL/L — SIGNIFICANT CHANGE UP (ref 98–110)
CO2 SERPL-SCNC: 26 MMOL/L — SIGNIFICANT CHANGE UP (ref 17–32)
COLOR SPEC: YELLOW — SIGNIFICANT CHANGE UP
CREAT SERPL-MCNC: 0.7 MG/DL — SIGNIFICANT CHANGE UP (ref 0.7–1.5)
DIFF PNL FLD: NEGATIVE — SIGNIFICANT CHANGE UP
EGFR: 103 ML/MIN/1.73M2 — SIGNIFICANT CHANGE UP
EGFR: 103 ML/MIN/1.73M2 — SIGNIFICANT CHANGE UP
EOSINOPHIL # BLD AUTO: 0.41 K/UL — SIGNIFICANT CHANGE UP (ref 0–0.7)
EOSINOPHIL NFR BLD AUTO: 5 % — SIGNIFICANT CHANGE UP (ref 0–8)
GLUCOSE SERPL-MCNC: 81 MG/DL — SIGNIFICANT CHANGE UP (ref 70–99)
GLUCOSE UR QL: NEGATIVE MG/DL — SIGNIFICANT CHANGE UP
HCG SERPL QL: NEGATIVE — SIGNIFICANT CHANGE UP
HCT VFR BLD CALC: 46.7 % — SIGNIFICANT CHANGE UP (ref 37–47)
HGB BLD-MCNC: 16.1 G/DL — HIGH (ref 12–16)
IMM GRANULOCYTES NFR BLD AUTO: 0.2 % — SIGNIFICANT CHANGE UP (ref 0.1–0.3)
KETONES UR QL: NEGATIVE MG/DL — SIGNIFICANT CHANGE UP
LACTATE SERPL-SCNC: 0.9 MMOL/L — SIGNIFICANT CHANGE UP (ref 0.7–2)
LEUKOCYTE ESTERASE UR-ACNC: NEGATIVE — SIGNIFICANT CHANGE UP
LIDOCAIN IGE QN: 274 U/L — HIGH (ref 7–60)
LYMPHOCYTES # BLD AUTO: 2.55 K/UL — SIGNIFICANT CHANGE UP (ref 1.2–3.4)
LYMPHOCYTES # BLD AUTO: 31.2 % — SIGNIFICANT CHANGE UP (ref 20.5–51.1)
MCHC RBC-ENTMCNC: 32.3 PG — HIGH (ref 27–31)
MCHC RBC-ENTMCNC: 34.5 G/DL — SIGNIFICANT CHANGE UP (ref 32–37)
MCV RBC AUTO: 93.8 FL — SIGNIFICANT CHANGE UP (ref 81–99)
MONOCYTES # BLD AUTO: 0.71 K/UL — HIGH (ref 0.1–0.6)
MONOCYTES NFR BLD AUTO: 8.7 % — SIGNIFICANT CHANGE UP (ref 1.7–9.3)
NEUTROPHILS # BLD AUTO: 4.38 K/UL — SIGNIFICANT CHANGE UP (ref 1.4–6.5)
NEUTROPHILS NFR BLD AUTO: 53.7 % — SIGNIFICANT CHANGE UP (ref 42.2–75.2)
NITRITE UR-MCNC: NEGATIVE — SIGNIFICANT CHANGE UP
NRBC BLD AUTO-RTO: 0 /100 WBCS — SIGNIFICANT CHANGE UP (ref 0–0)
PH UR: 7.5 — SIGNIFICANT CHANGE UP (ref 5–8)
PLATELET # BLD AUTO: 278 K/UL — SIGNIFICANT CHANGE UP (ref 130–400)
PMV BLD: 10.6 FL — HIGH (ref 7.4–10.4)
POTASSIUM SERPL-MCNC: 4 MMOL/L — SIGNIFICANT CHANGE UP (ref 3.5–5)
POTASSIUM SERPL-SCNC: 4 MMOL/L — SIGNIFICANT CHANGE UP (ref 3.5–5)
PROT SERPL-MCNC: 7.6 G/DL — SIGNIFICANT CHANGE UP (ref 6–8)
PROT UR-MCNC: NEGATIVE MG/DL — SIGNIFICANT CHANGE UP
RBC # BLD: 4.98 M/UL — SIGNIFICANT CHANGE UP (ref 4.2–5.4)
RBC # FLD: 12.1 % — SIGNIFICANT CHANGE UP (ref 11.5–14.5)
SODIUM SERPL-SCNC: 139 MMOL/L — SIGNIFICANT CHANGE UP (ref 135–146)
SP GR SPEC: 1.01 — SIGNIFICANT CHANGE UP (ref 1–1.03)
UROBILINOGEN FLD QL: 0.2 MG/DL — SIGNIFICANT CHANGE UP (ref 0.2–1)
WBC # BLD: 8.17 K/UL — SIGNIFICANT CHANGE UP (ref 4.8–10.8)
WBC # FLD AUTO: 8.17 K/UL — SIGNIFICANT CHANGE UP (ref 4.8–10.8)

## 2025-09-17 PROCEDURE — 83690 ASSAY OF LIPASE: CPT

## 2025-09-17 PROCEDURE — 36415 COLL VENOUS BLD VENIPUNCTURE: CPT

## 2025-09-17 PROCEDURE — 83605 ASSAY OF LACTIC ACID: CPT

## 2025-09-17 PROCEDURE — 74177 CT ABD & PELVIS W/CONTRAST: CPT

## 2025-09-17 PROCEDURE — 85025 COMPLETE CBC W/AUTO DIFF WBC: CPT

## 2025-09-17 PROCEDURE — 80048 BASIC METABOLIC PNL TOTAL CA: CPT

## 2025-09-17 PROCEDURE — 81003 URINALYSIS AUTO W/O SCOPE: CPT

## 2025-09-17 PROCEDURE — 84703 CHORIONIC GONADOTROPIN ASSAY: CPT

## 2025-09-17 PROCEDURE — 74177 CT ABD & PELVIS W/CONTRAST: CPT | Mod: 26

## 2025-09-17 PROCEDURE — 87086 URINE CULTURE/COLONY COUNT: CPT

## 2025-09-17 PROCEDURE — 80076 HEPATIC FUNCTION PANEL: CPT

## 2025-09-17 RX ORDER — ONDANSETRON HCL/PF 4 MG/2 ML
4 VIAL (ML) INJECTION ONCE
Refills: 0 | Status: COMPLETED | OUTPATIENT
Start: 2025-09-17 | End: 2025-09-17

## 2025-09-17 RX ADMIN — Medication 4 MILLIGRAM(S): at 10:49

## 2025-09-17 RX ADMIN — Medication 1000 MILLILITER(S): at 10:49

## 2025-09-19 RX ORDER — CEFPODOXIME PROXETIL 200 MG/1
1 TABLET, FILM COATED ORAL
Qty: 14 | Refills: 0
Start: 2025-09-19 | End: 2025-09-25

## 2025-09-20 LAB
-  AMOXICILLIN/CLAVULANIC ACID: SIGNIFICANT CHANGE UP
-  AMPICILLIN/SULBACTAM: SIGNIFICANT CHANGE UP
-  AMPICILLIN: SIGNIFICANT CHANGE UP
-  AZTREONAM: SIGNIFICANT CHANGE UP
-  CEFAZOLIN: SIGNIFICANT CHANGE UP
-  CEFEPIME: SIGNIFICANT CHANGE UP
-  CEFOXITIN: SIGNIFICANT CHANGE UP
-  CEFTRIAXONE: SIGNIFICANT CHANGE UP
-  CEFUROXIME: SIGNIFICANT CHANGE UP
-  CIPROFLOXACIN: SIGNIFICANT CHANGE UP
-  ERTAPENEM: SIGNIFICANT CHANGE UP
-  GENTAMICIN: SIGNIFICANT CHANGE UP
-  IMIPENEM: SIGNIFICANT CHANGE UP
-  LEVOFLOXACIN: SIGNIFICANT CHANGE UP
-  MEROPENEM: SIGNIFICANT CHANGE UP
-  NITROFURANTOIN: SIGNIFICANT CHANGE UP
-  PIPERACILLIN/TAZOBACTAM: SIGNIFICANT CHANGE UP
-  TIGECYCLINE: SIGNIFICANT CHANGE UP
-  TOBRAMYCIN: SIGNIFICANT CHANGE UP
-  TRIMETHOPRIM/SULFAMETHOXAZOLE: SIGNIFICANT CHANGE UP
CULTURE RESULTS: ABNORMAL
METHOD TYPE: SIGNIFICANT CHANGE UP
ORGANISM # SPEC MICROSCOPIC CNT: ABNORMAL
ORGANISM # SPEC MICROSCOPIC CNT: SIGNIFICANT CHANGE UP
SPECIMEN SOURCE: SIGNIFICANT CHANGE UP